# Patient Record
Sex: FEMALE | Race: WHITE
[De-identification: names, ages, dates, MRNs, and addresses within clinical notes are randomized per-mention and may not be internally consistent; named-entity substitution may affect disease eponyms.]

---

## 2020-06-05 ENCOUNTER — HOSPITAL ENCOUNTER (INPATIENT)
Dept: HOSPITAL 41 - JD.OB | Age: 30
LOS: 3 days | Discharge: HOME | End: 2020-06-08
Attending: OBSTETRICS & GYNECOLOGY | Admitting: OBSTETRICS & GYNECOLOGY
Payer: COMMERCIAL

## 2020-06-05 DIAGNOSIS — Z88.0: ICD-10-CM

## 2020-06-05 DIAGNOSIS — Z91.012: ICD-10-CM

## 2020-06-05 DIAGNOSIS — Z79.82: ICD-10-CM

## 2020-06-05 DIAGNOSIS — Z91.040: ICD-10-CM

## 2020-06-05 DIAGNOSIS — E66.9: ICD-10-CM

## 2020-06-05 DIAGNOSIS — Z3A.37: ICD-10-CM

## 2020-06-05 RX ADMIN — MAGNESIUM SULFATE IN WATER SCH MLS/HR: 40 INJECTION, SOLUTION INTRAVENOUS at 22:03

## 2020-06-05 NOTE — PCM.PREANE
Preanesthetic Assessment





- Procedure


Proposed Procedure: 





morena





- Anesthesia/Transfusion/Family Hx


Anesthesia History: No Prior Anesthesia


Family History of Anesthesia Reaction: No


Transfusion History: No Prior Transfusion(s)





- Review of Systems


General: No Symptoms


Pulmonary: No Symptoms


Cardiovascular: No Symptoms


Gastrointestinal: Nausea (tonight)


Neurological: No Symptoms


Other: Reports: None





- Physical Assessment


Vital Signs: 





 Last Vital Signs











Temp  96.5 F L  20 11:23


 


Pulse  119 H  20 11:23


 


Resp  14   20 11:23


 


BP  156/101 H  20 11:23


 


Pulse Ox  97   20 11:23











Height: 5 ft 9 in


Weight: 118.841 kg


ASA Class: 2


Mental Status: Alert & Oriented x3


Airway Class: Mallampati = 1


Dentition: Reports: Normal Dentition


Thyro-Mental Finger Breadths: 3


Mouth Opening Finger Breadths: 3


ROM/Head Extension: Full


Lungs: Clear to Auscultation, Normal Respiratory Effort


Cardiovascular: Regular Rate, Regular Rhythm





- Lab


Values: 





 Laboratory Last Values











WBC  13.46 K/mm3 (3.98-10.04)  H  20  11:45    


 


RBC  4.65 M/mm3 (3.98-5.22)   20  11:45    


 


Hgb  12.0 gm/dl (11.2-15.7)   20  11:45    


 


Hct  37.4 % (34.1-44.9)   20  11:45    


 


MCV  80.4 fl (79.4-94.8)   20  11:45    


 


MCH  25.8 pg (25.6-32.2)   20  11:45    


 


MCHC  32.1 g/dl (32.2-35.5)  L  20  11:45    


 


RDW Std Deviation  48.4 fL (36.4-46.3)  H  20  11:45    


 


Plt Count  263 K/mm3 (182-369)   20  11:45    


 


MPV  11.9 fl (9.4-12.3)   20  11:45    


 


Neut % (Auto)  78.3 % (34.0-71.1)  H  20  11:45    


 


Lymph % (Auto)  14.7 % (19.3-51.7)  L  20  11:45    


 


Mono % (Auto)  5.8 % (4.7-12.5)   20  11:45    


 


Eos % (Auto)  0.6  (0.7-5.8)  L  20  11:45    


 


Baso % (Auto)  0.2 % (0.1-1.2)   20  11:45    


 


Neut # (Auto)  10.54 K/mm3 (1.56-6.13)  H  20  11:45    


 


Lymph # (Auto)  1.98 K/mm3 (1.18-3.74)   20  11:45    


 


Mono # (Auto)  0.78 K/mm3 (0.24-0.36)  H  20  11:45    


 


Eos # (Auto)  0.08 K/mm3 (0.04-0.36)   20  11:45    


 


Baso # (Auto)  0.03 K/mm3 (0.01-0.08)   20  11:45    


 


Sodium  137 mEq/L (136-145)   20  11:45    


 


Potassium  4.0 mEq/L (3.5-5.1)   20  11:45    


 


Chloride  104 mEq/L ()   20  11:45    


 


Carbon Dioxide  20 mEq/L (21-32)  L  20  11:45    


 


Anion Gap  17.0  (5-15)  H  20  11:45    


 


BUN  8 mg/dL (7-18)   20  11:45    


 


Creatinine  0.8 mg/dL (0.55-1.02)   20  11:45    


 


Est Cr Clr Drug Dosing  TNP   20  11:45    


 


Estimated GFR (MDRD)  > 60 mL/min (>60)   20  11:45    


 


BUN/Creatinine Ratio  10.0  (14-18)  L  20  11:45    


 


Glucose  111 mg/dL ()  H  20  11:45    


 


Calcium  9.5 mg/dL (8.5-10.1)   20  11:45    


 


Total Bilirubin  0.3 mg/dL (0.2-1.0)   20  11:45    


 


AST  20 U/L (15-37)   20  11:45    


 


ALT  19 U/L (14-59)   20  11:45    


 


Alkaline Phosphatase  136 U/L ()  H  20  11:45    


 


Total Protein  7.0 g/dl (6.4-8.2)   20  11:45    


 


Albumin  2.7 g/dl (3.4-5.0)  L  20  11:45    


 


Globulin  4.3 gm/dL  20  11:45    


 


Albumin/Globulin Ratio  0.6  (1-2)  L  20  11:45    


 


Ur Random Creatinine  319.4 mg/dL (30.0-125.0)  H  20  11:22    


 


U Random Total Protein  32.3 mg/dL (0.0-11.8)  H  20  11:22    


 


Protein/Creatinin Ratio  101.1 mg/g (0-149)   20  11:22    














- Allergies


Allergies/Adverse Reactions: 


 Allergies











Allergy/AdvReac Type Severity Reaction Status Date / Time


 


latex Allergy  Rash Verified 20 11:46


 


Penicillins Allergy  Rash Verified 20 11:46


 


pumpkin Allergy  Rash Verified 20 11:46


 


eggs Allergy  Nausea and Uncoded 20 11:46





   Vomiting  














- Blood


Blood Available: No





- Acknowledgements


Anesthesia Type Planned: Epidural


Pt an Appropriate Candidate for the Planned Anesthesia: Yes


Alternatives and Risks of Anesthesia Discussed w Pt/Guardian: Yes


Pt/Guardian Understands and Agrees with Anesthesia Plan: Yes





PreAnesthesia Questionnaire


Cardiovascular History: Reports: Hypertension (gestational)


Respiratory History: Reports: None


Gastrointestinal History: Reports: GERD (with preg)


OB/GYN History: Reports: Pregnancy


: 1 (37 weeks)


Para: 0


Endocrine/Metabolic History: Reports: Obesity/BMI 30+





- SUBSTANCE USE


Smoking Status *Q: Never Smoker


Tobacco Use Within Last Twelve Months: No


Second Hand Smoke Exposure: No


Days Per Week of Alcohol Use: 0


Recreational Drug Use History: No





- HOME MEDS


Home Medications: 


 Home Meds





Aspirin [Halfprin] 1 tab PO DAILY 20 [History]


Iron 18 mg PO DAILY 20 [History]


Pnv,Calcium 72/Iron/Folic Acid [Pnv Prenatal Plus Multivit Tab] 1 each PO DAILY 

20 [History]











- CURRENT (IN HOUSE) MEDS


Current Meds: 





 Current Medications





Acetaminophen (Tylenol)  650 mg PO Q6H PRN


   PRN Reason: Pain (Mild 1-3) and fever


Calcium Gluconate (Calcium Gluconate)  1 gm IVPUSH ONETIME PRN


   PRN Reason: Other


   Stop: 20 22:00


Diphenhydramine HCl (Benadryl)  25 mg IVPUSH Q6H PRN


   PRN Reason: pruritis


Ephedrine Sulfate (Ephedrine Sulfate)  5 mg IVPUSH ASDIRECTED PRN


   PRN Reason: Hypotension


Fentanyl (Sublimaze)  100 mcg EPIDUR Q3H PRN


   PRN Reason: Pain


   Last Admin: 20 22:05 Dose:  100 mcg


Fentanyl/Bupivacaine HCl (Fentanyl/Bupivacaine/Ns 2 Mcg-0.125% 100 Ml)  100 ml 

EPIDUR ASDIRECTED PRN


   PRN Reason: Pain


   Last Admin: 20 22:05 Dose:  100 ml


Lactated Ringer's (Ringers, Lactated)  1,000 mls @ 100 mls/hr IV ASDIRECTED JANE


   Last Admin: 20 22:14 Dose:  100 mls/hr


Lactated Ringer's (Ringers, Lactated)  1,000 mls @ 40 mls/hr IV ASDIRECTED JANE


Oxytocin/Lactated Ringer's (Pitocin In Lr 10 Units/1,000 Ml)  10 unit in 1,000 

mls @ 100 mls/hr IV .CONTINUOUS JANE


Oxytocin/Lactated Ringer's (Pitocin In Lr 10 Units/1,000 Ml)  10 unit in 1,000 

mls @ 12 mls/hr IV TITRATE JANE; Protocol


   Last Titration: 20 19:46 Dose:  14 munits/min, 84 mls/hr


Magnesium Sulfate (Magnesium Sulfate In Water Premix)  40 gm in 1,000 mls @ 50 

mls/hr IV ASDIRECTED JANE


   Last Admin: 20 22:03 Dose:  50 mls/hr


Magnesium Sulfate 4 gm/ Premix  50 mls @ 12.5 mls/hr IV ONETIME ONE


   Stop: 20 01:09


   Last Admin: 20 21:38 Dose:  100 mls/hr


Misoprostol (Cytotec)  25 mcg VAG Q4H PRN


   PRN Reason: cervical ripening


Nalbuphine HCl (Nubain)  10 mg IVPUSH Q2H PRN


   PRN Reason: Pain


Sodium Chloride (Saline Flush)  10 ml FLUSH ASDIRECTED PRN


   PRN Reason: Keep Vein Open





Discontinued Medications





Acetaminophen (Tylenol)  650 mg PO Q6H PRN


   PRN Reason: Pain (Mild 1-3) and fever


Labetalol HCl (Normodyne)  20 mg IVPUSH ONETIME STA; Protocol


   Stop: 20 20:14


   Last Admin: 20 20:20 Dose:  20 mg


Labetalol HCl (Normodyne)  40 mg IVPUSH ONETIME STA; Protocol


   Stop: 20 20:45


   Last Admin: 20 20:49 Dose:  40 mg


Misoprostol (Cytotec) Confirm Administered Dose 25 mcg .ROUTE .STK-MED ONE


   Stop: 20 11:55


   Last Admin: 20 12:05 Dose:  25 mcg

## 2020-06-05 NOTE — PCM.PNLD
Labor Progress Note





- VS & Meds


Vital Signs: 


 Last Vital Signs











Temp  35.8 C L  06/05/20 11:23


 


Pulse  119 H  06/05/20 11:23


 


Resp  14   06/05/20 11:23


 


BP  156/101 H  06/05/20 11:23


 


Pulse Ox  97   06/05/20 11:23











Active Medications: 


 Current Medications





Acetaminophen (Tylenol)  650 mg PO Q6H PRN


   PRN Reason: Pain (Mild 1-3) and fever


Calcium Gluconate (Calcium Gluconate)  1 gm IVPUSH ONETIME PRN


   PRN Reason: Other


   Stop: 06/07/20 22:00


Diphenhydramine HCl (Benadryl)  25 mg IVPUSH Q6H PRN


   PRN Reason: pruritis


Ephedrine Sulfate (Ephedrine Sulfate)  5 mg IVPUSH ASDIRECTED PRN


   PRN Reason: Hypotension


Fentanyl (Sublimaze)  100 mcg EPIDUR Q3H PRN


   PRN Reason: Pain


   Last Admin: 06/05/20 22:05 Dose:  100 mcg


Fentanyl/Bupivacaine HCl (Fentanyl/Bupivacaine/Ns 2 Mcg-0.125% 100 Ml)  100 ml 

EPIDUR ASDIRECTED PRN


   PRN Reason: Pain


   Last Admin: 06/05/20 22:05 Dose:  100 ml


Lactated Ringer's (Ringers, Lactated)  1,000 mls @ 100 mls/hr IV ASDIRECTED JANE


   Last Admin: 06/05/20 22:14 Dose:  100 mls/hr


Lactated Ringer's (Ringers, Lactated)  1,000 mls @ 40 mls/hr IV ASDIRECTED JANE


Oxytocin/Lactated Ringer's (Pitocin In Lr 10 Units/1,000 Ml)  10 unit in 1,000 

mls @ 100 mls/hr IV .CONTINUOUS JANE


Oxytocin/Lactated Ringer's (Pitocin In Lr 10 Units/1,000 Ml)  10 unit in 1,000 

mls @ 12 mls/hr IV TITRATE JANE; Protocol


   Last Titration: 06/05/20 19:46 Dose:  14 munits/min, 84 mls/hr


Magnesium Sulfate (Magnesium Sulfate In Water Premix)  40 gm in 1,000 mls @ 50 

mls/hr IV ASDIRECTED JANE


   Last Admin: 06/05/20 22:03 Dose:  50 mls/hr


Magnesium Sulfate 4 gm/ Premix  50 mls @ 12.5 mls/hr IV ONETIME ONE


   Stop: 06/06/20 01:09


   Last Admin: 06/05/20 21:38 Dose:  100 mls/hr


Misoprostol (Cytotec)  25 mcg VAG Q4H PRN


   PRN Reason: cervical ripening


Nalbuphine HCl (Nubain)  10 mg IVPUSH Q2H PRN


   PRN Reason: Pain


Sodium Chloride (Saline Flush)  10 ml FLUSH ASDIRECTED PRN


   PRN Reason: Keep Vein Open





Discontinued Medications





Acetaminophen (Tylenol)  650 mg PO Q6H PRN


   PRN Reason: Pain (Mild 1-3) and fever


Labetalol HCl (Normodyne)  20 mg IVPUSH ONETIME STA; Protocol


   Stop: 06/05/20 20:14


   Last Admin: 06/05/20 20:20 Dose:  20 mg


Labetalol HCl (Normodyne)  40 mg IVPUSH ONETIME STA; Protocol


   Stop: 06/05/20 20:45


   Last Admin: 06/05/20 20:49 Dose:  40 mg


Misoprostol (Cytotec) Confirm Administered Dose 25 mcg .ROUTE .STK-MED ONE


   Stop: 06/05/20 11:55


   Last Admin: 06/05/20 12:05 Dose:  25 mcg











- Uterine Contractions


Uterine Monitoring Mode: External [place]


Contraction Frequency (min): 2-4


Contraction Duration (sec): 45-60


Contraction Intensity: Irritability


Uterine Resting Tone: Soft





- Fetal Monitoring


Fetal Monitor Mode: Doppler/Auscultation


Fetal Heart Rate (FHR) Baseline: 130


Fetal Heart Rate (FHR) Per Doppler: 130


Fetal Heart Rate (FHR) Variability: Moderate (6-25 bmp)


Fetal Accelerations: Present, 15x15


Fetal Decelerations: Late, Intermittent (<50% x 20 min)


Fetal Strip Review: Category II





- Vaginal Exam


Dilation (cm): 5


Effacement (Percent): 90


Station: -1


Cervical Position: Anterior


Sterile Vaginal Exam Performed By: Adrián Levi





- Labor Progress (Free Text)


Labor Progress: 





Patient continuing to make progress at this time with cervical exam at 5/90/-1/

soft/anterior


Epidural in place and providing good relief


Patient previously with multiple severe range blood pressures requiring IV 

labetalol 20 mg then IV labetalol 40 mg


Patient now with normal range blood pressures


Patient diagnosed with preeclampsia with severe features based on the severe 

range blood pressures


Started on magnesium for seizure prophylaxis with a 4 g bolus then 2 g/h


Patient with 4+ reflexes in bilateral bicep tendon


Routine vitals


Continue Pitocin for augmentation of labor


Run total IV fluids at 125 mL/h


Anticipate vaginal delivery unless otherwise indicated





Adrián Levi MD


11:05 PM


6/5/2020

## 2020-06-05 NOTE — PCM.LDHP
L&D History of Present Illness





- General


Date of Service: 20


Admit Problem/Dx: 


 Patient Status Order with Admit Dx/Problem





20 11:23


Patient Status [ADT] Routine 








 Admission Diagnosis/Problem











Admission Diagnosis/Problem    Gestational hypertension














Source of Information: Patient


History Limitations: Reports: No Limitations





- History of Present Illness


Introduction:: 





Dipti Gutierrez is a 29-year-old G1, P0 at 37 weeks 0 days by LMP consistent 

with 11-week ultrasound who presents for induction of labor in the setting of 

gestational hypertension.  She was seen in the clinic today, 2020, and was 

noted to have ongoing mild range blood pressures with a BP of 144/94.  She had 

previously been seen earlier in the week and had blood pressures in the 130s to 

140s/80s to 90s.  Her labs earlier in the week were within normal limits and 

did not show any low platelets, elevated liver enzymes, elevated creatinine or 

elevated urine protein/creatinine ratio.  Patient denies any severe symptoms of 

preeclampsia without any significant headaches, vision changes or scotomata or 

epigastric pain.  She reports that she has been having some [place] 

contractions since she was seen on 2020 where she will have a contraction 

about every 2 hours.  She denies any leaking of fluid or vaginal bleeding.  

Reports good fetal movement.


Present Illness Comments:: 





Dipti Gutierrez is a 29-year-old G1, P0 at 37 weeks 0 days by LMP consistent 

with 11-week ultrasound who presents for medically indicated induction of labor 

in the setting of gestational hypertension.  She has had routine prenatal care 

with myself, Dr. Levi, since 11 weeks gestational age.  Her pregnancy has been 

overall uncomplicated until this week when she started to develop mild range 

blood pressures.  She was given Tdap vaccine on 2020.  She declined 

influenza vaccine due to history of severe anaphylaxis reaction with eggs.





This pregnancy is complicated by:


* Gestational hypertension diagnosed in the clinic with ongoing mild range 

blood pressure of 144/94 today with previous mild range blood pressures earlier 

in the week.  No signs or symptoms of preeclampsia.


* Obesity in pregnancy


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








OB/GYN history


: Current pregnancy








Prenatal labs


Blood type: A+


Antibody screen: Negative


First trimester hematocrit/hemoglobin: 38.6%/12.5 on 2019


Platelets: 326 on 2019


Urine culture: Mixed barbra suggestive of contamination


Rubella status: Immune


Hepatitis B surface antigen: Negative


RPR: Negative


HIV: Negative


Gonorrhea: Negative


Chlamydia: Negative


Anatomy ultrasound: Normal anatomy, no abnormalities, posterior placenta, 82nd 

percentile on most recent ultrasound on 2020, normal fluid, normal heart 

rate


One hour glucose tolerance test: 122


Second trimester hematocrit/hemoglobin: 34.2%/10.6 on 3/17/2020


Platelets: 302 on 3/17/2020


Third trimester hematocrit/hemoglobin: 37.0%/11.7 on 2020


Platelets: 257 on 2020


Creatinine: 0.7 on 2020


ALT: 25 on 2020


AST: 24 on 2020


GBS status: Negative





- Related Data


Allergies/Adverse Reactions: 


 Allergies











Allergy/AdvReac Type Severity Reaction Status Date / Time


 


latex Allergy  Rash Verified 20 11:46


 


Penicillins Allergy  Rash Verified 20 11:46


 


pumpkin Allergy  Rash Verified 20 11:46


 


eggs Allergy  Nausea and Uncoded 20 11:46





   Vomiting  











Home Medications: 


 Home Meds





Aspirin [Halfprin] 1 tab PO DAILY 20 [History]


Iron 18 mg PO DAILY 20 [History]


Pnv,Calcium 72/Iron/Folic Acid [Pnv Prenatal Plus Multivit Tab] 1 each PO DAILY 

20 [History]











Past Medical History


OB/GYN History: Reports: Pregnancy


: 1


Para: 0





Social & Family History





- Tobacco Use


Smoking Status *Q: Never Smoker


Tobacco Use Within Last Twelve Months: No





- Tobacco Core Measures


Tobacco Use/Smoking Within Last 30 Days: No


Smokeless Tobacco Use in Last 30 Days: No





- Alcohol Use


Alcohol Use History: No





- Recreational Drug Use


Recreational Drug Use: No


Drug Use in Last 12 Months: No





- Living Situation & Occupation


Living situation: Reports: , with Spouse


Occupation: Employed





H&P Review of Systems





- Review of Systems:


Review Of Systems: See Below


General: Denies: Fever, Chills, Malaise, Weakness, Fatigue


HEENT: Denies: Dysphasia, Ear Pain, Glasses, Headaches, Sore Throat, Visual 

Changes


Pulmonary: Denies: Shortness of Breath, Wheezing, Pleuritic Chest Pain, Cough


Cardiovascular: Denies: Chest Pain, Palpitations, Dyspnea on Exertion, Orthopnea


Gastrointestinal: Reports: Diarrhea (over last few days).  Denies: Abdominal 

Pain, Constipation, Nausea, Vomiting


Genitourinary: Denies: Dysuria, Frequency, Burning, Pain, Urgency


Musculoskeletal: Reports: Back Pain


Skin: Denies: Rash, Lesions


Psychiatric: Denies: Depression, Anxiety


Neurological: Denies: Headache


Hematologic/Lymphatic: Denies: Anemia





L&D Exam





- Exam


Exam: See Below





- OB Specific


Contraction Intensity: Irritability


Fetal Movement: Active


Fetal Heart Tones: Present


Fetal Heart Tones per Min: 140 (+15 x 15 accelerations, no decelerations)


Fetal Heart Rate (FHR) Variability: Moderate (6-25 bmp)


Birth Presentation: Vertex


Estimated Fetal Weight: 7 to 7.5 pounds by Leopold





- Cadet Score


Cadet Score Cervix Position: Posterior


Cadet Score Consistency: Medium


Cadet Score Effacement: 31-50% (50%)


Cadet Score Dilation: 1-2 cm (1.5 cm)


Cadet Score Infant's Station: -3 (-4)


Cadet Score Total: 3





- Exam


General: Alert, Oriented


HEENT: Conjunctiva Clear, EOMI


Neck: Supple, Trachea Midline


Lungs: Clear to Auscultation, Normal Respiratory Effort


Cardiovascular: Regular Rate, Regular Rhythm


GI/Abdominal Exam: Soft, Non-Tender, No Distention, Other (Gravid).  No: 

Guarding, Rigid, Rebound


Genitourinary: Other (18 [place] Rae bulb placed transcervically with speculum 

and ring forceps and filled with 60 mL of sterile saline.  Mother and infant 

tolerated without difficulty.)


Extremities: Normal Inspection, Pedal Edema (Trace)


Skin: Warm, Dry, Intact


Psychiatric: Alert, Normal Affect, Normal Mood





- Patient Data


Result Diagrams: 


 20 11:45








- Problem List


(1) 37 weeks gestation of pregnancy


SNOMED Code(s): 95032863


   ICD Code: Z3A.37 - 37 WEEKS GESTATION OF PREGNANCY   Status: Acute   Current 

Visit: Yes   





(2) Gestational hypertension


SNOMED Code(s): 239651506


   ICD Code: O13.9 - GESTATIONAL HTN W/O SIGNIFICANT PROTEINURIA, UNSP 

TRIMESTER   Status: Acute   Current Visit: Yes   





(3) Obesity affecting pregnancy


SNOMED Code(s): 502551029276, 176871139167


   ICD Code: O99.210 - OBESITY COMPLICATING PREGNANCY, UNSPECIFIED TRIMESTER   

Status: Acute   Current Visit: Yes   


Problem List Initiated/Reviewed/Updated: Yes


Orders Last 24hrs: 


 Active Orders 24 hr











 Category Date Time Status


 


 Patient Status [ADT] Routine ADT  20 11:23 Ordered


 


 Activity as Tolerated [RC] PFP Care  20 11:23 Ordered


 


 Communication Order [RC] ASDIRECTED Care  20 11:23 Ordered


 


 Communication Order [RC] ASDIRECTED Care  20 11:23 Ordered


 


 Communication Order [RC] ASDIRECTED Care  20 11:23 Ordered


 


 Communication Order [RC] ASDIRECTED Care  20 11:23 Ordered


 


 Fetal Heart Tones [RC] ASDIRECTED Care  20 11:23 Ordered


 


 Fetal Non Stress Test [RC] PER UNIT ROUTINE Care  20 11:23 Ordered


 


 Notify Provider Vital Signs [RC] PRN Care  20 11:24 Ordered


 


 Notify Provider [RC] ASDIRECTED Care  20 11:23 Ordered


 


 Notify Provider [RC] ASDIRECTED Care  20 11:26 Ordered


 


 Notify Provider [RC] PFP Care  20 11:23 Ordered


 


 Notify Provider [RC] PRN Care  20 11:23 Ordered


 


 Peripheral IV Care [RC] .AS DIRECTED Care  20 11:23 Ordered


 


 Pump Management, Intrathecal [RC] ASDIRECTED Care  20 11:24 Ordered


 


 Vaginal Exam [RC] ASDIRECTED Care  20 11:23 Ordered


 


 Vital Signs [RC] PER UNIT ROUTINE Care  20 11:23 Ordered


 


 Regular Diet [DIET] Diet  20 Lunch Ordered


 


 CBC WITH AUTO DIFF [HEME] Routine Lab  20 11:22 Ordered


 


 COMPREHENSIVE METABOLIC PN,CMP [CHEM] Routine Lab  20 11:22 Ordered


 


 PROTEIN/CREATININE RATIO,URINE [URCHEM] Routine Lab  20 11:22 Ordered


 


 RAPID PLASMA REAGIN,RPR [CHEM] Routine Lab  20 11:23 Ordered


 


 Acetaminophen [Tylenol] Med  20 11:22 Ordered





 650 mg PO Q6H PRN   


 


 Lactated Ringers [Ringers, Lactated] 1,000 ml Med  20 11:30 Ordered





 IV ASDIRECTED   


 


 Lactated Ringers [Ringers, Lactated] 1,000 ml Med  20 11:30 Ordered





 IV ASDIRECTED   


 


 Nalbuphine [Nubain] Med  20 11:22 Ordered





 10 mg IVPUSH Q2H PRN   


 


 Oxytocin/Lactated Ringers [Pitocin in LR 10 Units/1,000 Med  20 11:30 

Ordered





 ML]   





 10 unit in 1,000 ml IV .CONTINUOUS   


 


 Sodium Chloride 0.9% [Saline Flush] Med  20 11:22 Ordered





 10 ml FLUSH ASDIRECTED PRN   


 


 miSOPROStoL [Cytotec] Med  20 11:22 Ordered





 25 mcg VAG Q4H PRN   


 


 Electronic Fetal Heart Tones Ext w TOCO [WOMSER] Ot  20 11:23 Ordered





 Routine   


 


 Electronic Fetal Heart Tones Internal [WOMSER] Per Unit Ot  20 11:23 

Ordered





 Routine   


 


 Medication Administration Instruction [OM.PC] Ot  20 11:30 Ordered





 ASDIRECTED   


 


 Peripheral IV Insertion Adult [OM.PC] Routine Ot  20 11:23 Ordered


 


 Resuscitation Status Routine Resus Stat  20 11:22 Ordered











Assessment/Plan Comment:: 





Refer to observation for elective induction of labor


Transcervical 18 [place] Rae bulb placed with speculum and ring forceps.  

Mother and infant tolerated without difficulty.


Start induction of labor with Cytotec 25 mcg vaginally now and every 4 hours


Intermittent monitoring while on Cytotec with monitoring for 30 minutes after 

placement of Cytotec and may ambulate as tolerated with category 1 monitoring


Place IV and have Lactated Ringer's at 125 ml/hr if not tolerating regular diet


May have regular diet while on Cytotec induction


Activity as tolerated


May have epidural as desired


Plans to breast-feed after delivery


Anticipate vaginal delivery unless otherwise indicated








Adrián Levi MD


12:10 PM


2020

## 2020-06-06 PROCEDURE — 3E0R3BZ INTRODUCTION OF ANESTHETIC AGENT INTO SPINAL CANAL, PERCUTANEOUS APPROACH: ICD-10-PCS | Performed by: OBSTETRICS & GYNECOLOGY

## 2020-06-06 PROCEDURE — 0KQM0ZZ REPAIR PERINEUM MUSCLE, OPEN APPROACH: ICD-10-PCS | Performed by: OBSTETRICS & GYNECOLOGY

## 2020-06-06 PROCEDURE — 3E0P7VZ INTRODUCTION OF HORMONE INTO FEMALE REPRODUCTIVE, VIA NATURAL OR ARTIFICIAL OPENING: ICD-10-PCS | Performed by: OBSTETRICS & GYNECOLOGY

## 2020-06-06 PROCEDURE — 00HU33Z INSERTION OF INFUSION DEVICE INTO SPINAL CANAL, PERCUTANEOUS APPROACH: ICD-10-PCS | Performed by: OBSTETRICS & GYNECOLOGY

## 2020-06-06 RX ADMIN — VITAMIN A, ASCORBIC ACID, CHOLECALCIFEROL, .ALPHA.-TOCOPHEROL ACETATE, DL-, THIAMINE MONONITRATE, RIBOFLAVIN, NIACINAMIDE, PYRIDOXINE HYDROCHLORIDE, FOLIC ACID, CYANOCOBALAMIN, CALCIUM CARBONATE, IRON, ZINC OXIDE, AND CUPRIC OXIDE SCH EACH: 4000; 120; 400; 22; 1.84; 3; 20; 10; 1; 12; 200; 29; 25; 2 TABLET ORAL at 17:20

## 2020-06-06 RX ADMIN — MAGNESIUM SULFATE IN WATER SCH MLS/HR: 40 INJECTION, SOLUTION INTRAVENOUS at 17:21

## 2020-06-06 NOTE — PCM.DEL
L & D Note





- General Info


Date of Service: 20


Mother's Due Date: 20





- Delivery Note


Labor: Augmented by Oxytocin


Cervical Ripening Method: Balloon Device (18 French transcervical Rae bulb 

filled with 60 mL of sterile saline), Misoprostil, Oxytocin


Delivery Outcome: Livebirth


Infant Delivery Method: Spontaneous Vaginal Delivery-Single


Birth Presentation: Left Occiput Anterior (MARIA LUISA)


Nuchal Cord: None


Prep: Povidone-Iodine (Betadine


Anesthesia Type: Epidural


Anesthetic: Lidocaine (Xylocaine) 1% Plain


Local Anesthetic Volume: Other (7 mL)


Amniotic Fluid Description: Clear


Episiotomy Type: None


Laceration: 2nd Degree (Midline perineal repaired with 3-0 Vicryl), Labial (

Right side, repaired with running suture of 4-0 Vicryl)


Suture type: Vicryl


Suture size: 3-0


Placenta: Intact, Spontaneous


Cord: 3 Vessels


Estimated Blood Loss: 400


Resuscitation Needed: Yes


: Bulb Syringe, Cathether, Stimulated, Warmed, Cathay Used, Warmer Used


 Provider: Adrián Levi


APGAR Score 1 min: 7


APGAR Score 5 min: 9


Second Stage Interventions: Reports: Pushing Effectively, Pushing, Stirrups/Leg 

Supports


Delivery Comments (Free Text/Narrative):: 





Stage I: Dipti Gutierrez was admitted for induction of labor in the setting of 

gestational hypertension with ongoing mild range blood pressures in the clinic.

  She had been seen in the clinic prior to being sent down to labor and 

delivery with a blood pressure of 144/94. On admission her cervix was dilated 

to 1.5 cm. She was GBS negative.  On admission her labs were all within normal 

limits and did not show any evidence of severe features of preeclampsia with 

platelets of 263, normal creatinine of 0.8, AST 20 and ALT 19.  Her urine 

protein/creatinine ratio was 0.10.  Based on these results she was diagnosed 

with gestational hypertension with continued monitoring during the induction.  

She had an 18 French Rae bulb placed transcervically with use of speculum and 

ring forceps and was filled with 60 mL of sterile saline.  She was given 

Cytotec 25 mcg vaginally for cervical ripening.  The Rae bulb came out 

spontaneously after approximately 4 hours of time.  She was transitioned to 

Pitocin for augmentation of labor.  She had spontaneous rupture of membranes 

with clear fluid.  In the early evening of hospital day 1 she began having 

severe range blood pressures into the 160s to 170s/90s to 100s.  She was 

treated with IV labetalol 20 mg and her blood pressure was rechecked and it 

continued to be in the 160s/100s and was given the next dose of labetalol 40 mg 

IV.  After 10 minutes her blood pressure was normalized.  She continued to have 

mild range blood pressures throughout the remainder of her induction.  She was 

given an epidural for anesthesia.  She progressed to complete and pushing.





Stage II: On 2020 she had a normal vaginal delivery of a live male infant 

at 06:45. Apgars of 7 & 9. Weight of 3580 g (7 lbs 14.3 oz). Length of 20 

inches. There was no nuchal cord. Infant was delivered in MARIA LUISA position. The 

cord was doubly clamped and cut by father the infant. Infant was placed on 

mother's abdomen initially and then taken to the warmer for further 

resuscitation.





Stage III: She had a spontaneous delivery of an intact placenta in Leilani 

presentation.  Three vessel cord. She was given pitocin and fundal massage. She 

had a second-degree midline perineal laceration that was repaired with 3-0 

Vicryl.  She had a right labial laceration that was repaired with running 

suture of 4-0 Vicryl.  During repair of the right labial laceration she had 

injection of 1% lidocaine 7 mL.  Mom and baby were stable to recovery. EBL of 

400 mL.








Adrián Levi MD


7:59 AM


2020





Induction Criteria





- Cadet Score


Cadet Score Dilation: 1-2 cm


Cadet Score Effacement: 40-50%


Cadet Score Infant's Station: -3


Cadet Score Consistency: Medium


Cadet Score Cervix Position: Midposition


Cadet Score Total: 4


Cadet Score Presenting Part: Reports: Cephalic





- Induction


Gestational Age >/= 39 wks: No


Medical Indication: 





Gestational hypertension


Estimated Pelvis: Reports: Adequate


Reassuring Fetal Monitoring Strip: Yes


Absence of Tachy Systole: Yes





- Augmentation


Estimated Pelvis: Reports: Adequate


Fetal Weight Estimated:: Reports: AGA


Reassuring Fetal Monitoring Strip: Yes


Absence of Tachy Systole: Yes





- General Info


Date of Service: 20





- Patient Data


Vitals - Most Recent: 


 Last Vital Signs











Temp  35.8 C L  20 11:23


 


Pulse  119 H  06/05/20 11:23


 


Resp  14   20 11:23


 


BP  156/101 H  20 11:23


 


Pulse Ox  97   20 11:23











Weight - Most Recent: 118.841 kg


I&O - Last 24 Hours: 


 Intake & Output











 20





 22:59 06:59 14:59


 


Output Total 800  


 


Balance -800  











Lab Results Last 24 Hours: 


 Laboratory Results - last 24 hr











  20 Range/Units





  11:22 11:45 11:45 


 


WBC   13.46 H   (3.98-10.04)  K/mm3


 


RBC   4.65   (3.98-5.22)  M/mm3


 


Hgb   12.0   (11.2-15.7)  gm/dl


 


Hct   37.4   (34.1-44.9)  %


 


MCV   80.4   (79.4-94.8)  fl


 


MCH   25.8   (25.6-32.2)  pg


 


MCHC   32.1 L   (32.2-35.5)  g/dl


 


RDW Std Deviation   48.4 H   (36.4-46.3)  fL


 


Plt Count   263   (182-369)  K/mm3


 


MPV   11.9   (9.4-12.3)  fl


 


Neut % (Auto)   78.3 H   (34.0-71.1)  %


 


Lymph % (Auto)   14.7 L   (19.3-51.7)  %


 


Mono % (Auto)   5.8   (4.7-12.5)  %


 


Eos % (Auto)   0.6 L   (0.7-5.8)  


 


Baso % (Auto)   0.2   (0.1-1.2)  %


 


Neut # (Auto)   10.54 H   (1.56-6.13)  K/mm3


 


Lymph # (Auto)   1.98   (1.18-3.74)  K/mm3


 


Mono # (Auto)   0.78 H   (0.24-0.36)  K/mm3


 


Eos # (Auto)   0.08   (0.04-0.36)  K/mm3


 


Baso # (Auto)   0.03   (0.01-0.08)  K/mm3


 


Sodium    137  (136-145)  mEq/L


 


Potassium    4.0  (3.5-5.1)  mEq/L


 


Chloride    104  ()  mEq/L


 


Carbon Dioxide    20 L  (21-32)  mEq/L


 


Anion Gap    17.0 H  (5-15)  


 


BUN    8  (7-18)  mg/dL


 


Creatinine    0.8  (0.55-1.02)  mg/dL


 


Est Cr Clr Drug Dosing    TNP  


 


Estimated GFR (MDRD)    > 60  (>60)  mL/min


 


BUN/Creatinine Ratio    10.0 L  (14-18)  


 


Glucose    111 H  ()  mg/dL


 


Calcium    9.5  (8.5-10.1)  mg/dL


 


Total Bilirubin    0.3  (0.2-1.0)  mg/dL


 


AST    20  (15-37)  U/L


 


ALT    19  (14-59)  U/L


 


Alkaline Phosphatase    136 H  ()  U/L


 


Total Protein    7.0  (6.4-8.2)  g/dl


 


Albumin    2.7 L  (3.4-5.0)  g/dl


 


Globulin    4.3  gm/dL


 


Albumin/Globulin Ratio    0.6 L  (1-2)  


 


Ur Random Creatinine  319.4 H    (30.0-125.0)  mg/dL


 


U Random Total Protein  32.3 H    (0.0-11.8)  mg/dL


 


Protein/Creatinin Ratio  101.1    (0-149)  mg/g


 


RPR     (NONREACTIVE)  














  20 Range/Units





  11:45 


 


WBC   (3.98-10.04)  K/mm3


 


RBC   (3.98-5.22)  M/mm3


 


Hgb   (11.2-15.7)  gm/dl


 


Hct   (34.1-44.9)  %


 


MCV   (79.4-94.8)  fl


 


MCH   (25.6-32.2)  pg


 


MCHC   (32.2-35.5)  g/dl


 


RDW Std Deviation   (36.4-46.3)  fL


 


Plt Count   (182-369)  K/mm3


 


MPV   (9.4-12.3)  fl


 


Neut % (Auto)   (34.0-71.1)  %


 


Lymph % (Auto)   (19.3-51.7)  %


 


Mono % (Auto)   (4.7-12.5)  %


 


Eos % (Auto)   (0.7-5.8)  


 


Baso % (Auto)   (0.1-1.2)  %


 


Neut # (Auto)   (1.56-6.13)  K/mm3


 


Lymph # (Auto)   (1.18-3.74)  K/mm3


 


Mono # (Auto)   (0.24-0.36)  K/mm3


 


Eos # (Auto)   (0.04-0.36)  K/mm3


 


Baso # (Auto)   (0.01-0.08)  K/mm3


 


Sodium   (136-145)  mEq/L


 


Potassium   (3.5-5.1)  mEq/L


 


Chloride   ()  mEq/L


 


Carbon Dioxide   (21-32)  mEq/L


 


Anion Gap   (5-15)  


 


BUN   (7-18)  mg/dL


 


Creatinine   (0.55-1.02)  mg/dL


 


Est Cr Clr Drug Dosing   


 


Estimated GFR (MDRD)   (>60)  mL/min


 


BUN/Creatinine Ratio   (14-18)  


 


Glucose   ()  mg/dL


 


Calcium   (8.5-10.1)  mg/dL


 


Total Bilirubin   (0.2-1.0)  mg/dL


 


AST   (15-37)  U/L


 


ALT   (14-59)  U/L


 


Alkaline Phosphatase   ()  U/L


 


Total Protein   (6.4-8.2)  g/dl


 


Albumin   (3.4-5.0)  g/dl


 


Globulin   gm/dL


 


Albumin/Globulin Ratio   (1-2)  


 


Ur Random Creatinine   (30.0-125.0)  mg/dL


 


U Random Total Protein   (0.0-11.8)  mg/dL


 


Protein/Creatinin Ratio   (0-149)  mg/g


 


RPR  Non-reactive  (NONREACTIVE)  











Med Orders - Current: 


 Current Medications





Acetaminophen (Tylenol)  650 mg PO Q6H PRN


   PRN Reason: Pain (Mild 1-3) and fever


Calcium Gluconate (Calcium Gluconate)  1 gm IVPUSH ONETIME PRN


   PRN Reason: Other


   Stop: 20 22:00


Diphenhydramine HCl (Benadryl)  25 mg IVPUSH Q6H PRN


   PRN Reason: pruritis


Ephedrine Sulfate (Ephedrine Sulfate)  5 mg IVPUSH ASDIRECTED PRN


   PRN Reason: Hypotension


Fentanyl (Sublimaze)  100 mcg EPIDUR Q3H PRN


   PRN Reason: Pain


   Last Admin: 20 22:05 Dose:  100 mcg


Fentanyl/Bupivacaine HCl (Fentanyl/Bupivacaine/Ns 2 Mcg-0.125% 100 Ml)  100 ml 

EPIDUR ASDIRECTED PRN


   PRN Reason: Pain


   Last Admin: 20 22:05 Dose:  100 ml


Lactated Ringer's (Ringers, Lactated)  1,000 mls @ 100 mls/hr IV ASDIRECTED JANE


   Last Infusion: 20 23:00 Dose:  35 mls/hr


Lactated Ringer's (Ringers, Lactated)  1,000 mls @ 40 mls/hr IV ASDIRECTED JANE


Oxytocin/Lactated Ringer's (Pitocin In Lr 10 Units/1,000 Ml)  10 unit in 1,000 

mls @ 100 mls/hr IV .CONTINUOUS JANE


   Last Admin: 20 07:07 Dose:  500 mls/hr


Oxytocin/Lactated Ringer's (Pitocin In Lr 10 Units/1,000 Ml)  10 unit in 1,000 

mls @ 12 mls/hr IV TITRATE JANE; Protocol


   Last Titration: 20 06:45 Dose:  999 munits/min, 5,994 mls/hr


Magnesium Sulfate (Magnesium Sulfate In Water Premix)  40 gm in 1,000 mls @ 50 

mls/hr IV ASDIRECTED JANE


   Stop: 20 06:45


   Last Admin: 20 22:03 Dose:  50 mls/hr


Misoprostol (Cytotec)  25 mcg VAG Q4H PRN


   PRN Reason: cervical ripening


Nalbuphine HCl (Nubain)  10 mg IVPUSH Q2H PRN


   PRN Reason: Pain


Sodium Chloride (Saline Flush)  10 ml FLUSH ASDIRECTED PRN


   PRN Reason: Keep Vein Open





Discontinued Medications





Acetaminophen (Tylenol)  650 mg PO Q6H PRN


   PRN Reason: Pain (Mild 1-3) and fever


Magnesium Sulfate 4 gm/ Premix  50 mls @ 12.5 mls/hr IV ONETIME ONE


   Stop: 20 01:09


   Last Admin: 20 21:38 Dose:  100 mls/hr


Labetalol HCl (Normodyne)  20 mg IVPUSH ONETIME STA; Protocol


   Stop: 20 20:14


   Last Admin: 20 20:20 Dose:  20 mg


Labetalol HCl (Normodyne)  40 mg IVPUSH ONETIME STA; Protocol


   Stop: 20 20:45


   Last Admin: 20 20:49 Dose:  40 mg


Lidocaine HCl (Xylocaine 1%) Confirm Administered Dose 50 ml .ROUTE .STK-MED ONE


   Stop: 20 07:05


   Last Admin: 20 07:08 Dose:  50 ml


Lidocaine HCl (Xylocaine 1%)  50 ml INJECT ONETIME ONE


   Stop: 20 07:09


Misoprostol (Cytotec) Confirm Administered Dose 25 mcg .ROUTE .STK-MED ONE


   Stop: 20 11:55


   Last Admin: 20 12:05 Dose:  25 mcg











- Problem List & Annotations


(1) 37 weeks gestation of pregnancy


SNOMED Code(s): 96510841


   Code(s): Z3A.37 - 37 WEEKS GESTATION OF PREGNANCY   Status: Acute   Current 

Visit: Yes   





(2) Obesity affecting pregnancy


SNOMED Code(s): 055086564725, 777651084555


   Code(s): O99.210 - OBESITY COMPLICATING PREGNANCY, UNSPECIFIED TRIMESTER   

Status: Acute   Current Visit: Yes   





(3) Preeclampsia, severe


SNOMED Code(s): 93151874


   Code(s): O14.10 - SEVERE PRE-ECLAMPSIA, UNSPECIFIED TRIMESTER   Status: 

Acute   Current Visit: Yes   





(4) Vaginal delivery


SNOMED Code(s): 971053017


   Code(s): O80 - ENCOUNTER FOR FULL-TERM UNCOMPLICATED DELIVERY   Status: 

Acute   Current Visit: Yes   





(5) Second degree perineal laceration during delivery


SNOMED Code(s): 9041653


   Code(s): O70.1 - SECOND DEGREE PERINEAL LACERATION DURING DELIVERY   Status: 

Acute   Current Visit: Yes   





(6) Obstetric labial laceration, delivered, current hospitalization


SNOMED Code(s): 554953983, 831382402, 311875308


   Code(s): O70.0 - FIRST DEGREE PERINEAL LACERATION DURING DELIVERY   Status: 

Acute   Current Visit: Yes   





- Problem List Review


Problem List Initiated/Reviewed/Updated: Yes





- My Orders


Last 24 Hours: 


My Active Orders





20 11:22


Nalbuphine [Nubain]   10 mg IVPUSH Q2H PRN 


Sodium Chloride 0.9% [Saline Flush]   10 ml FLUSH ASDIRECTED PRN 


miSOPROStoL [Cytotec]   25 mcg VAG Q4H PRN 


Resuscitation Status Routine 





20 11:23


Patient Status [ADT] Routine 


Activity as Tolerated [RC] PFP 


Communication Order [RC] ASDIRECTED 


Fetal Heart Tones [RC] ASDIRECTED 


Fetal Non Stress Test [RC] PER UNIT ROUTINE 


Notify Provider [RC] ASDIRECTED 


Notify Provider [RC] PFP 


Notify Provider [RC] PRN 


Peripheral IV Care [RC] .AS DIRECTED 


Vaginal Exam [RC] ASDIRECTED 


Vital Signs [RC] PER UNIT ROUTINE 


Electronic Fetal Heart Tones Ext w TOCO [WOMSER] Routine 


Electronic Fetal Heart Tones Internal [WOMSER] Per Unit Routine 


Peripheral IV Insertion Adult [OM.PC] Routine 





20 11:24


Notify Provider Vital Signs [RC] PRN 


Pump Management, Intrathecal [RC] ASDIRECTED 





20 11:26


Notify Provider [RC] ASDIRECTED 





20 11:30


Acetaminophen [Tylenol]   650 mg PO Q6H PRN 


Lactated Ringers [Ringers, Lactated] 1,000 ml IV ASDIRECTED 


Lactated Ringers [Ringers, Lactated] 1,000 ml IV ASDIRECTED 


Oxytocin/Lactated Ringers [Pitocin in LR 10 Units/1,000 ML] 10 unit in 1,000 ml 

IV .CONTINUOUS 


Medication Administration Instruction [OM.PC] ASDIRECTED 





20 16:30


Oxytocin/Lactated Ringers [Pitocin in LR 10 Units/1,000 ML] 10 unit in 1,000 ml 

IV TITRATE 





20 21:15


Magnesium Sulfate/Water [Magnesium Sulfate in Water Premix] 40 gm in 1,000 ml 

IV ASDIRECTED 





20 21:47


Calcium Gluconate   1 gm IVPUSH ONETIME PRN 





20 Lunch


Regular Diet [DIET] 





20 07:42


Patient Status Manage Transfer [TRANSFER] Routine 














- Plan


Plan:: 





Admit to inpatient following normal spontaneous vaginal delivery


Continue Pitocin per unit protocol following delivery of placenta and lactated 

Ringer's until tolerating regular diet


Regular diet


Vitals per unit routine


Ibuprofen and Tylenol for pain control


Assist with breast-feeding as needed


Continue magnesium 2 g/h for 24 hours after delivery


Continue IV fluids to keep a total fluid rate of 125 mL/h


Monitor closely for signs of severe features of preeclampsia with headaches, 

vision changes or epigastric pain


Continue to monitor lochia


Anticipate discharge home on postpartum day #2





Adráin Levi MD


7:59 AM


2020

## 2020-06-07 RX ADMIN — FERROUS SULFATE TAB EC 324 MG (65 MG FE EQUIVALENT) SCH MG: 324 (65 FE) TABLET DELAYED RESPONSE at 09:06

## 2020-06-07 RX ADMIN — VITAMIN A, ASCORBIC ACID, CHOLECALCIFEROL, .ALPHA.-TOCOPHEROL ACETATE, DL-, THIAMINE MONONITRATE, RIBOFLAVIN, NIACINAMIDE, PYRIDOXINE HYDROCHLORIDE, FOLIC ACID, CYANOCOBALAMIN, CALCIUM CARBONATE, IRON, ZINC OXIDE, AND CUPRIC OXIDE SCH: 4000; 120; 400; 22; 1.84; 3; 20; 10; 1; 12; 200; 29; 25; 2 TABLET ORAL at 14:43

## 2020-06-07 NOTE — PCM.SN.2
- Free Text/Narrative


Note: 





Post Partum Progress Note


PPD #1





Subjective:


Doing well overall.  Ambulating without difficulty.  Lochia minimal.  Voiding 

without difficulty.  Tolerating regular diet without nausea or vomiting.  Pain 

controlled with oral medications.  Breast-feeding with minimal difficulty.  She 

denies any headaches, vision changes or epigastric pain.





Objective: 


Vitals:


 Vital Signs - 24 hr











  20





  12:38 15:07 20:20


 


Temperature 36.7 C 36.8 C 36.2 C


 


Pulse,  97 89





Peripheral   


 


Respiratory 14 14 14





Rate   


 


Blood Pressure 149/79 H 142/80 H 146/68 H


 


O2 Sat by Pulse  96 96





Oximetry   














  20





  04:09 08:51


 


Temperature 36.8 C 36.3 C


 


Pulse, 77 81





Peripheral  


 


Respiratory 14 14





Rate  


 


Blood Pressure 137/70 141/79 H


 


O2 Sat by Pulse 98 98





Oximetry  














Physical Exam


General: Alert and oriented, no acute distress


Lungs: Clear to auscultation bilaterally


Heart: Regular rate and rhythm


Abdomen: Soft, minimal appropriate tenderness, non-distended, fundus midline, 

nontender, and at the umbilicus


Extremities: Trace edema in bilateral lower extremities to mid shins








ASSESSMENT: 


29-year-old female -0-0-1 s/p normal vaginal delivery PPD #1, complicated 

by preeclampsia with severe features and was kept on magnesium sulfate IV for 

24 hours after delivery and obesity





PLAN: 


Doing well


Magnesium sulfate IV was discontinued this morning.  


Patient has continued to have mild range blood pressures throughout the 

evening.  We will continue to monitor throughout the day today to ensure that 

she is not having any mild or severe range blood pressures prior to discharge


Breast-feeding with minimal difficulty. Assist as needed


Lochia minimal. Continue to monitor for appropriate lochia.


Continue routine postpartum care


Anticipate discharge home tomorrow








Adrián Levi MD


9:41 AM


2020

## 2020-06-08 RX ADMIN — FERROUS SULFATE TAB EC 324 MG (65 MG FE EQUIVALENT) SCH MG: 324 (65 FE) TABLET DELAYED RESPONSE at 07:26

## 2020-06-08 RX ADMIN — VITAMIN A, ASCORBIC ACID, CHOLECALCIFEROL, .ALPHA.-TOCOPHEROL ACETATE, DL-, THIAMINE MONONITRATE, RIBOFLAVIN, NIACINAMIDE, PYRIDOXINE HYDROCHLORIDE, FOLIC ACID, CYANOCOBALAMIN, CALCIUM CARBONATE, IRON, ZINC OXIDE, AND CUPRIC OXIDE SCH EACH: 4000; 120; 400; 22; 1.84; 3; 20; 10; 1; 12; 200; 29; 25; 2 TABLET ORAL at 07:27

## 2020-06-08 NOTE — PCM.DCSUM1
**Discharge Summary





- Hospital Course


Free Text/Narrative:: 





- General Info


Date of Service: 20


Mother's Due Date: 20





- Delivery Note


Labor: Augmented by Oxytocin


Cervical Ripening Method: Balloon Device (18 French transcervical Rae bulb 

filled with 60 mL of sterile saline), Misoprostil, Oxytocin


Delivery Outcome: Livebirth


Infant Delivery Method: Spontaneous Vaginal Delivery-Single


Birth Presentation: Left Occiput Anterior (MARIA LUISA)


Nuchal Cord: None


Prep: Povidone-Iodine (Betadine


Anesthesia Type: Epidural


Anesthetic: Lidocaine (Xylocaine) 1% Plain


Local Anesthetic Volume: Other (7 mL)


Amniotic Fluid Description: Clear


Episiotomy Type: None


Laceration: 2nd Degree (Midline perineal repaired with 3-0 Vicryl), Labial (

Right side, repaired with running suture of 4-0 Vicryl)


Suture type: Vicryl


Suture size: 3-0


Placenta: Intact, Spontaneous


Cord: 3 Vessels


Estimated Blood Loss: 400


Resuscitation Needed: Yes


[place]: Bulb Syringe, Cathether, Stimulated, Warmed, [place] Used, Warmer Used


 Provider: Adrián Levi


APGAR Score 1 min: 7


APGAR Score 5 min: 9


Second Stage Interventions: Reports: Pushing Effectively, Pushing, Stirrups/Leg 

Supports


Delivery Comments (Free Text/Narrative):: 





Stage I: Dipti Gutierrez was admitted for induction of labor in the setting of 

gestational hypertension with ongoing mild range blood pressures in the clinic.

  She had been seen in the clinic prior to being sent down to labor and 

delivery with a blood pressure of 144/94. On admission her cervix was dilated 

to 1.5 cm. She was GBS negative.  On admission her labs were all within normal 

limits and did not show any evidence of severe features of preeclampsia with 

platelets of 263, normal creatinine of 0.8, AST 20 and ALT 19.  Her urine 

protein/creatinine ratio was 0.10.  Based on these results she was diagnosed 

with gestational hypertension with continued monitoring during the induction.  

She had an 18 French Rae bulb placed transcervically with use of speculum and 

ring forceps and was filled with 60 mL of sterile saline.  She was given 

Cytotec 25 mcg vaginally for cervical ripening.  The Rae bulb came out 

spontaneously after approximately 4 hours of time.  She was transitioned to 

Pitocin for augmentation of labor.  She had spontaneous rupture of membranes 

with clear fluid.  In the early evening of hospital day 1 she began having 

severe range blood pressures into the 160s to 170s/90s to 100s.  She was 

treated with IV labetalol 20 mg and her blood pressure was rechecked and it 

continued to be in the 160s/100s and was given the next dose of labetalol 40 mg 

IV.  After 10 minutes her blood pressure was normalized.  She continued to have 

mild range blood pressures throughout the remainder of her induction.  She was 

given an epidural for anesthesia.  She progressed to complete and pushing.





Stage II: On 2020 she had a normal vaginal delivery of a live male infant 

at 06:45. Apgars of 7 & 9. Weight of 3580 g (7 lbs 14.3 oz). Length of 20 

inches. There was no nuchal cord. Infant was delivered in MARIA LUISA position. The 

cord was doubly clamped and cut by father the infant. Infant was placed on 

mother's abdomen initially and then taken to the warmer for further 

resuscitation.





Stage III: She had a spontaneous delivery of an intact placenta in Le[place] 

presentation.  Three vessel cord. She was given pitocin and fundal massage. She 

had a second-degree midline perineal laceration that was repaired with 3-0 

Vicryl.  She had a right labial laceration that was repaired with running 

suture of 4-0 Vicryl.  During repair of the right labial laceration she had 

injection of 1% lidocaine 7 mL.  Mom and baby were stable to recovery. EBL of 

400 mL.


HPI Initial Comments: 





- General Info


Date of Service: 20


Mother's Due Date: 20





- Delivery Note


Labor: Augmented by Oxytocin


Cervical Ripening Method: Balloon Device (18 French transcervical Rae bulb 

filled with 60 mL of sterile saline), Misoprostil, Oxytocin


Delivery Outcome: Livebirth


Infant Delivery Method: Spontaneous Vaginal Delivery-Single


Birth Presentation: Left Occiput Anterior (MARIA LUISA)


Nuchal Cord: None


Prep: Povidone-Iodine (Betadine


Anesthesia Type: Epidural


Anesthetic: Lidocaine (Xylocaine) 1% Plain


Local Anesthetic Volume: Other (7 mL)


Amniotic Fluid Description: Clear


Episiotomy Type: None


Laceration: 2nd Degree (Midline perineal repaired with 3-0 Vicryl), Labial (

Right side, repaired with running suture of 4-0 Vicryl)


Suture type: Vicryl


Suture size: 3-0


Placenta: Intact, Spontaneous


Cord: 3 Vessels


Estimated Blood Loss: 400


Resuscitation Needed: Yes


[place]: Bulb Syringe, Cathether, Stimulated, Warmed, [place] Used, Warmer Used


 Provider: Adrián Levi


APGAR Score 1 min: 7


APGAR Score 5 min: 9


Second Stage Interventions: Reports: Pushing Effectively, Pushing, Stirrups/Leg 

Supports


Delivery Comments (Free Text/Narrative):: 





Stage I: Dipti Gutierrez was admitted for induction of labor in the setting of 

gestational hypertension with ongoing mild range blood pressures in the clinic.

  She had been seen in the clinic prior to being sent down to labor and 

delivery with a blood pressure of 144/94. On admission her cervix was dilated 

to 1.5 cm. She was GBS negative.  On admission her labs were all within normal 

limits and did not show any evidence of severe features of preeclampsia with 

platelets of 263, normal creatinine of 0.8, AST 20 and ALT 19.  Her urine 

protein/creatinine ratio was 0.10.  Based on these results she was diagnosed 

with gestational hypertension with continued monitoring during the induction.  

She had an 18 French Rae bulb placed transcervically with use of speculum and 

ring forceps and was filled with 60 mL of sterile saline.  She was given 

Cytotec 25 mcg vaginally for cervical ripening.  The Rae bulb came out 

spontaneously after approximately 4 hours of time.  She was transitioned to 

Pitocin for augmentation of labor.  She had spontaneous rupture of membranes 

with clear fluid.  In the early evening of hospital day 1 she began having 

severe range blood pressures into the 160s to 170s/90s to 100s.  She was 

treated with IV labetalol 20 mg and her blood pressure was rechecked and it 

continued to be in the 160s/100s and was given the next dose of labetalol 40 mg 

IV.  After 10 minutes her blood pressure was normalized.  She continued to have 

mild range blood pressures throughout the remainder of her induction.  She was 

given an epidural for anesthesia.  She progressed to complete and pushing.





Stage II: On 2020 she had a normal vaginal delivery of a live male infant 

at 06:45. Apgars of 7 & 9. Weight of 3580 g (7 lbs 14.3 oz). Length of 20 

inches. There was no nuchal cord. Infant was delivered in MARIA LUISA position. The 

cord was doubly clamped and cut by father the infant. Infant was placed on 

mother's abdomen initially and then taken to the warmer for further 

resuscitation.





Stage III: She had a spontaneous delivery of an intact placenta in [place] 

presentation.  Three vessel cord. She was given pitocin and fundal massage. She 

had a second-degree midline perineal laceration that was repaired with 3-0 

Vicryl.  She had a right labial laceration that was repaired with running 

suture of 4-0 Vicryl.  During repair of the right labial laceration she had 

injection of 1% lidocaine 7 mL.  Mom and baby were stable to recovery. EBL of 

400 mL.


Brief History: - General Info.  Date of Service: 20.  Mother's Due Date: 

20.  - Delivery Note.  Labor: Augmented by Oxytocin.  Cervical Ripening 

Method: Balloon Device (18 French transcervical Rae bulb filled with 60 mL of 

sterile saline), Misoprostil, Oxytocin.  Delivery Outcome: Livebirth.  Infant 

Delivery Method: Spontaneous Vaginal Delivery-Single.  Birth Presentation: Left 

Occiput Anterior (MARIA LUISA).  Nuchal Cord: None.  Prep: Povidone-Iodine (Betadine.  

Anesthesia Type: Epidural.  Anesthetic: Lidocaine (Xylocaine) 1% Plain.  Local 

Anesthetic Volume: Other (7 mL).  Amniotic Fluid Description: Clear.  

Episiotomy Type: None.  Laceration: 2nd Degree (Midline perineal repaired with 3

-0 Vicryl), Labial (Right side, repaired with running suture of 4-0 Vicryl).  

Suture type: Vicryl.  Suture size: 3-0.  Placenta: Intact, Spontaneous.  Cord: 

3 Vessels.  Estimated Blood Loss: 400.  Resuscitation Needed: Yes.  [place]: 

Bulb Syringe, Cathether, Stimulated, Warmed, [place] Used, Warmer Used.  

 Provider: Adrián Levi.  APGAR Score 1 min: 7.  APGAR Score 5 min: 9.

  Second Stage Interventions: Reports: Pushing Effectively, Pushing, Stirrups/

Leg Supports.  Delivery Comments (Free Text/Narrative)::  Stage I: Dipti Gutierrez was admitted for induction of labor in the setting of gestational 

hypertension with ongoing mild range blood pressures in the clinic.  She had 

been seen in the clinic prior to being sent down to labor and delivery with a 

blood pressure of 144/94. On admission her cervix was dilated to 1.5 cm. She 

was GBS negative.  On admission her labs were all within normal limits and did 

not show any evidence of severe features of preeclampsia with platelets of 263, 

normal creatinine of 0.8, AST 20 and ALT 19.  Her urine protein/creatinine 

ratio was 0.10.  Based on these results she was diagnosed with gestational 

hypertension with continued monitoring during the induction.  She had an 18 

French Rae bulb placed transcervically with use of speculum and ring forceps 

and was filled with 60 mL of sterile saline.  She was given Cytotec 25 mcg 

vaginally for cervical ripening.  The Rae bulb came out spontaneously after 

approximately 4 hours of time.  She was transitioned to Pitocin for 

augmentation of labor.  She had spontaneous rupture of membranes with clear 

fluid.  In the early evening of hospital day 1 she began having severe range 

blood pressures into the 160s to 170s/90s to 100s.  She was treated with IV 

labetalol 20 mg and her blood pressure was rechecked and it continued to be in 

the 160s/100s and was given the next dose of labetalol 40 mg IV.  After 10 

minutes her blood pressure was normalized.  She continued to have mild range 

blood pressures throughout the remainder of her induction.  She was given an 

epidural for anesthesia.  She progressed to complete and pushing.  Stage II: On 

2020 she had a normal vaginal delivery of a live male infant at 06:45. 

Apgars of 7 & 9. Weight of 3580 g (7 lbs 14.3 oz). Length of 20 inches. There 

was no nuchal cord. Infant was delivered in MARIA LUISA position. The cord was doubly 

clamped and cut by father the infant. Infant was placed on mother's abdomen 

initially and then taken to the warmer for further resuscitation.  Stage III: 

She had a spontaneous delivery of an intact placenta in [place] presentation.  

Three vessel cord. She was given pitocin and fundal massage. She had a second-

degree midline perineal laceration that was repaired with 3-0 Vicryl.  She had 

a right labial laceration that was repaired with running suture of 4-0 Vicryl.  

During repair of the right labial laceration she had injection of 1% lidocaine 

7 mL.  Mom and baby were stable to recovery. EBL of 400 mL.


Diagnosis: Stroke: No





- Discharge Data


Discharge Date: 20


Discharge Disposition: Home, Self-Care 01


Condition: Good





- Referral to Home Health


Primary Care Physician: 


Adrián Levi MD








- Discharge Diagnosis/Problem(s)


(1) 37 weeks gestation of pregnancy


SNOMED Code(s): 92142294


   ICD Code: Z3A.37 - 37 WEEKS GESTATION OF PREGNANCY   Status: Acute   Current 

Visit: Yes   





(2) Obesity affecting pregnancy


SNOMED Code(s): 603513500681, 674009216101


   ICD Code: O99.210 - OBESITY COMPLICATING PREGNANCY, UNSPECIFIED TRIMESTER   

Status: Acute   Current Visit: Yes   





(3) Preeclampsia, severe


SNOMED Code(s): 72928662


   ICD Code: O14.10 - SEVERE PRE-ECLAMPSIA, UNSPECIFIED TRIMESTER   Status: 

Acute   Current Visit: Yes   





(4) Vaginal delivery


SNOMED Code(s): 553496508


   ICD Code: O80 - ENCOUNTER FOR FULL-TERM UNCOMPLICATED DELIVERY   Status: 

Acute   Current Visit: Yes   





(5) Second degree perineal laceration during delivery


SNOMED Code(s): 5217734


   ICD Code: O70.1 - SECOND DEGREE PERINEAL LACERATION DURING DELIVERY   Status

: Acute   Current Visit: Yes   





(6) Obstetric labial laceration, delivered, current hospitalization


SNOMED Code(s): 659801051, 398865856, 308635795


   ICD Code: O70.0 - FIRST DEGREE PERINEAL LACERATION DURING DELIVERY   Status: 

Acute   Current Visit: Yes   





- Patient Summary/Data


Complications: None


Consults: 





None


Hospital Course: 





Dipti Gutierrez was admitted for induction of labor in the setting of 

gestational hypertension with mild range blood pressures in the clinic.  On 

admission her cervix was dilated to 1.5 cm.  She was GBS negative.she had an 18 

French Rae bulb placed transcervically with use of speculum and ring forceps 

and was filled with 60 mL of sterile saline.  She was given Cytotec 25 mcg 

vaginally for cervical ripening.  She received 1 dose of Cytotec for induction.

  She was transitioned to Pitocin for augmentation of labor.  She had 

spontaneous rupture of membranes with clear fluid.  In the early evening of 

hospital day #1 she began having severe range blood pressures into the 160s to 

170s/90s to 100s.  She was treated with labetalol 20 mg IV and on recheck 

continued to be severe range blood pressure and was given labetalol 40 mg IV.  

Her blood pressures normalized afterwards and were in normal to mild range 

throughout the remainder of the induction.  She was started on magnesium for 

seizure prophylaxis after being diagnosed with preeclampsia with severe 

features.  She was given an epidural for anesthesia.  She progressed to 

complete and began pushing.  On 2020 she had a normal vaginal delivery of a 

live male infant at 06:45.  Apgars of 7 and 9.  Weight of 3580 g (7 pounds 14.3 

ounces).  Her postpartum course was uneventful.  She was continued on magnesium 

sulfate IV for 24 hours after delivery.  Her pain was well controlled and she 

had minimal lochia.  She was ambulating, tolerating a regular diet and voiding 

normally.  She was breast-feeding with small amounts of formula supplementation 

with minimal difficulty.  She was afebrile and her hematocrit was 37.4 on 

admission.she was continued to be monitored throughout postpartum day #1 due to 

ongoing mild range blood pressures.  She had normal range blood pressures by 

postpartum day #2.  She desired to be discharged home on the morning of PPD #2.

  Her blood type is A+.








- Patient Instructions


Diet: Regular Diet as Tolerated


Activity: Apply Ice, As Tolerated


Activity, Other: Nothing in the vagina for 6 weeks


Driving: May Drive Today


Showering/Bathing: May Shower


Notify Provider of: Fever, Increased Pain, Swelling and Redness, Drainage, 

Nausea and/or Vomiting


Other/Special Instructions: Please contact your physician's office if you have 

heavy vaginal bleeding enough to soak a pad in less than an hour for several 

hours.  Monitor for any signs of an infection in the breasts with severe pain 

or redness of the breast.  Monitor for any signs of elevated blood pressure 

such as severe headaches, flashes of light in her vision or pain in your upper 

stomach.





- Discharge Plan


*PRESCRIPTION DRUG MONITORING PROGRAM REVIEWED*: Not Applicable


*COPY OF PRESCRIPTION DRUG MONITORING REPORT IN PATIENT EUSEBIO: Not Applicable


Home Medications: 


 Home Meds





Iron 18 mg PO DAILY 20 [History]


Pnv,Calcium 72/Iron/Folic Acid [Pnv Prenatal Plus Multivit Tab] 1 each PO DAILY 

20 [History]


Acetaminophen [Tylenol] 650 mg PO Q6H PRN  tablet 20 [Rx]


Benzocaine/Menthol [Dermoplast Pain Relief Spray] 1 spray TOP ASDIRECTED PRN  

canister 20 [Rx]


Docusate Sodium [Colace] 100 mg PO BID PRN  cap 20 [Rx]


Hydrocortisone Acetate [Anucort-HC] 25 mg RECTAL BID PRN  supp 20 [Rx]


Ibuprofen [Motrin] 600 mg PO Q6H PRN  tablet 20 [Rx]


Magnesium Hydroxide [Milk of Magnesia] 30 ml PO BEDTIME PRN  cup 20 [Rx]


witch hazeL [Tucks] 1 pad TOP ASDIRECTED PRN  pad 20 [Rx]








Patient Handouts:  Preeclampsia and Eclampsia, Care of a Perineal Tear, 

Postpartum Care After Vaginal Delivery


Referrals: 


Adrián Levi MD [Primary Care Provider] -  (Follow-up in 1 week for routine 

postpartum visit or earlier as needed.)





- Discharge Summary/Plan Comment


DC Time >30 min.: No





- Patient Data


Vitals - Most Recent: 


 Last Vital Signs











Temp  37.0 C   20 04:11


 


Pulse  79   20 04:11


 


Resp  14   20 04:11


 


BP  135/71   20 04:11


 


Pulse Ox  97   20 04:11











Weight - Most Recent: 118.841 kg


Med Orders - Current: 


 Current Medications





Acetaminophen (Tylenol)  650 mg PO Q6H PRN


   PRN Reason: mild pain or fever


   Last Admin: 20 22:31 Dose:  650 mg


Benzocaine/Menthol (Dermoplast Pain Relief Spray)  0 gm TOP ASDIRECTED PRN


   PRN Reason: Perineal Comfort Measure


   Last Admin: 20 12:56 Dose:  1 spray


Calcium Gluconate (Calcium Gluconate)  1 gm IV ASDIRECTED PRN


   PRN Reason: respiratory distress


Docusate Sodium (Colace)  100 mg PO BID PRN


   PRN Reason: Constipation


   Last Admin: 20 09:06 Dose:  100 mg


Ferrous Sulfate (Ferrous Sulfate)  324 mg PO WITHBREAKFAST JANE


   Last Admin: 20 07:26 Dose:  324 mg


Hydrocortisone Acetate (Anucort-Hc)  25 mg RECTAL BID PRN


   PRN Reason: Hemorrhoid pain


Lactated Ringer's (Ringers, Lactated)  1,000 mls @ 75 mls/hr IV ASDIRECTED JANE


   Last Admin: 20 20:48 Dose:  75 mls/hr


Oxytocin/Lactated Ringer's (Pitocin In Lr 10 Units/1,000 Ml)  10 unit in 1,000 

mls @ 100 mls/hr IV TITRATE JANE; Protocol


Ibuprofen (Motrin)  600 mg PO Q6H PRN


   PRN Reason: Mild pain or fever


   Last Admin: 20 00:25 Dose:  600 mg


Magnesium Hydroxide (Milk Of Magnesia)  30 ml PO BEDTIME PRN


   PRN Reason: Constipation


Prenat Multivit/Time/Iron/Folic Ac (Prenatal Plus Iron)  1 each PO DAILY JANE


   Last Admin: 20 07:27 Dose:  1 each


Witch Hazel (Tucks)  1 pad TOP ASDIRECTED PRN


   PRN Reason: Perineal Comfort Measure


   Last Admin: 20 12:56 Dose:  1 pad





Discontinued Medications





Acetaminophen (Tylenol)  650 mg PO Q6H PRN


   PRN Reason: Pain (Mild 1-3) and fever


Acetaminophen (Tylenol)  650 mg PO Q6H PRN


   PRN Reason: Pain (Mild 1-3) and fever


Bupivacaine HCl (Sensorcaine-Mpf 0.25%)  10 ml .ROUTE .STK-MED ONE


   Stop: 20 06:01


Calcium Gluconate (Calcium Gluconate)  1 gm IVPUSH ONETIME PRN


   PRN Reason: Other


   Stop: 20 22:00


Diphenhydramine HCl (Benadryl)  25 mg IVPUSH Q6H PRN


   PRN Reason: pruritis


Ephedrine Sulfate (Ephedrine Sulfate)  5 mg IVPUSH ASDIRECTED PRN


   PRN Reason: Hypotension


Fentanyl (Sublimaze)  100 mcg EPIDUR Q3H PRN


   PRN Reason: Pain


   Last Admin: 20 22:05 Dose:  100 mcg


Fentanyl/Bupivacaine HCl (Fentanyl/Bupivacaine/Ns 2 Mcg-0.125% 100 Ml)  100 ml 

EPIDUR ASDIRECTED PRN


   PRN Reason: Pain


   Last Admin: 20 22:05 Dose:  100 ml


Lactated Ringer's (Ringers, Lactated)  1,000 mls @ 100 mls/hr IV ASDIRECTED JANE


   Last Infusion: 20 23:00 Dose:  35 mls/hr


Lactated Ringer's (Ringers, Lactated)  1,000 mls @ 40 mls/hr IV ASDIRECTED JANE


Oxytocin/Lactated Ringer's (Pitocin In Lr 10 Units/1,000 Ml)  10 unit in 1,000 

mls @ 100 mls/hr IV .CONTINUOUS JANE


   Last Admin: 20 07:07 Dose:  500 mls/hr


Oxytocin/Lactated Ringer's (Pitocin In Lr 10 Units/1,000 Ml)  10 unit in 1,000 

mls @ 12 mls/hr IV TITRATE JANE; Protocol


   Last Titration: 20 06:45 Dose:  999 munits/min, 5,994 mls/hr


Magnesium Sulfate (Magnesium Sulfate In Water Premix)  40 gm in 1,000 mls @ 50 

mls/hr IV ASDIRECTED JANE


   Stop: 20 06:45


   Last Admin: 20 17:21 Dose:  50 mls/hr


Magnesium Sulfate 4 gm/ Premix  50 mls @ 12.5 mls/hr IV ONETIME ONE


   Stop: 20 01:09


   Last Admin: 20 21:38 Dose:  100 mls/hr


Labetalol HCl (Normodyne)  20 mg IVPUSH ONETIME STA; Protocol


   Stop: 20 20:14


   Last Admin: 20 20:20 Dose:  20 mg


Labetalol HCl (Normodyne)  40 mg IVPUSH ONETIME STA; Protocol


   Stop: 20 20:45


   Last Admin: 20 20:49 Dose:  40 mg


Lidocaine HCl (Xylocaine 1%) Confirm Administered Dose 50 ml .ROUTE .STK-MED ONE


   Stop: 20 07:05


   Last Admin: 20 12:56 Dose:  50 ml


Lidocaine HCl (Xylocaine 1%)  50 ml INJECT ONETIME ONE


   Stop: 20 07:09


   Last Admin: 20 16:01 Dose:  Not Given


Misoprostol (Cytotec)  25 mcg VAG Q4H PRN


   PRN Reason: cervical ripening


Misoprostol (Cytotec) Confirm Administered Dose 25 mcg .ROUTE .STK-MED ONE


   Stop: 20 11:55


   Last Admin: 20 12:05 Dose:  25 mcg


Nalbuphine HCl (Nubain)  10 mg IVPUSH Q2H PRN


   PRN Reason: Pain


Sodium Chloride (Saline Flush)  10 ml FLUSH ASDIRECTED PRN


   PRN Reason: Keep Vein Open

## 2020-06-08 NOTE — PCM.SN.2
- Free Text/Narrative


Note: 





Post Partum Progress Note


PPD #2





Subjective:


Doing well overall.  Ambulating without difficulty and increasing activity.  

Lochia minimal.  Voiding without difficulty.  Tolerating regular diet without 

nausea or vomiting.  Pain controlled with oral medications and states that it 

is improving today.  Breast-feeding with small amounts of formula 

supplementation as needed to start feeding with minimal difficulty.  She denies 

any headaches, vision changes or epigastric pain.





Objective: 


Vitals:


 Vital Signs - 24 hr











  20





  12:27 16:10 20:30


 


Temperature  36.6 C 36.4 C


 


Pulse, 88 87 87





Peripheral   


 


Respiratory 14 14 14





Rate   


 


Blood Pressure 143/79 H 134/84 136/75


 


O2 Sat by Pulse 98 100 97





Oximetry   














  20





  04:11


 


Temperature 37.0 C


 


Pulse, 79





Peripheral 


 


Respiratory 14





Rate 


 


Blood Pressure 135/71


 


O2 Sat by Pulse 97





Oximetry 

















Physical Exam


General: Alert and oriented, no acute distress


Lungs: Clear to auscultation bilaterally


Heart: Regular rate and rhythm


Abdomen: Soft, minimal appropriate tenderness, non-distended, fundus midline, 

nontender, and at the umbilicus


Extremities: Trace edema in bilateral lower extremities to ankles








ASSESSMENT: 


29-year-old female -0-0-1 s/p normal vaginal delivery PPD #2, complicated 

by preeclampsia with severe features and was kept on magnesium sulfate IV for 

24 hours after delivery and obesity





PLAN: 


Doing well


Continued improvement with her blood pressures with occasional mild range blood 

pressures yesterday and none today.


Breast-feeding with small amounts of formula supplementation as needed with 

minimal difficulty. Assist as needed


Lochia minimal. Continue to monitor for appropriate lochia.


Continue routine postpartum care


Discharge home today








Adrián Levi MD


8:54 AM


2020

## 2021-06-10 NOTE — PCM.LDHP
L&D History of Present Illness





- General


Date of Service: 21


Admit Problem/Dx: 


                           Admission Diagnosis/Problem











Admission Diagnosis/Problem    














06/10/21 09:51


Dipti is a 30-year-old  2 para 1-0-0-1 female who was admitted on 

2021 at 37-0/7 weeks gestational age with an REINIER of 2021 for medical 

induction of labor for diagnosis of gestational hypertension.


Source of Information: Patient


History Limitations: Reports: No Limitations





- History of Present Illness


Introduction:: 





Dipti is a 30-year-old  2 para 1-0-0-1 female who was admitted on 

2021 at 37-0/7 weeks gestational age with an REINIER of 2021 for medical 

induction of labor for diagnosis of gestational hypertension.  She has been 

running borderline blood pressures throughout the last part of the pregnancy.  

Has a history of gestational hypertension progressing to preeclampsia with 

severe features with her last pregnancy.  She has been on a baby aspirin every 

day throughout the pregnancy since early second trimester.  The process of 

induction of labor, its risks, benefits, limitations, follow-up and alternatives

are discussed in detail with the patient.  She appears understand and wishes to 

proceed.





OB/GYN history: Patient had menarche at age 11.  Cycles q. 28 days.  Duration 5 

to 7 days.  Last certain menstrual period 2020.  Her REINIER of 2021 is 

determined by her LMP.  Patient denies any abnormal Pap smears.  She denies any 

STDs.  Her previous pregnancy ended as follows:





1.  Male infant born 2020 at 37-1/7 weeks gestational age8 pounds 14 

ouncesNSVDepidural usedsecond-degree midline perineal laceration and right 

labial laceration repaired.  Induction was for gestational hypertension with 

development of preeclampsia with severe features.





Prenatal course.  Patient was initially seen for this pregnancy on 2020.  

She is seen on a regular basis throughout the pregnancy.  Her weight increase 

was from 246 to 269 pounds for 23 pound increase.  Her blood pressures have been

borderline normal to borderline elevated during the last portion of the 

pregnancy.  Her fundal height growth has been appropriate.  Baby is in vertex 

presentation by Leopold maneuvers on last evaluation in clinic on 2021.  Her

group B strep screen is negative.  Hepatitis C antibody was negative.  She 

declined flu vaccination as she is allergic to eggs.  Her EPDS score on 2021

was 0/30.  Tdap given on 2021.  Patient is rubella immune.





Laboratory testing pregnancy shows first prenatal laboratory testing done 

recently in the clinic has returned within normal limits. Please call the 

patient with results and place them in the laboratory portion of her prenatal 

chart. Thank you.  To include a positive blood type negative antibody screen.  

Hemoglobin is 11.7 g/dL.  Platelets were 303,000.  She is rubella immune.  RPR 

is nonreactive.  Patient had Gardnerella vaginalis and Flagyl was recommended 

for bacterial vaginosis.  Hepatitis B surface antigen and HIV assays were both 

negative as were the chlamydia and gonorrhea test.  Hepatitis C antibody was 

negative on 2021.  Second trimester labs showed a hemoglobin of 11.2 g/dL 

and platelets at 300,000.  Her 1 hour GTT was normal at 118.  Recheck of her 

hemoglobin on 5/10/2020 and was 10.5 g/dL and platelets are 272,000.  RPR on 

2021 was nonreactive and group B strep screen was negative.





Allergies:





1.  Penicillins which cause a rash





2.  Eggs which cause diarrhea





3.  Latex gloves which cause a rash.





Medications:





1.  Prenatal vitamins 1 p.o. daily





2.  Baby aspirin 81 mg p.o. daily





Family history: Maternal grandmother, maternal aunt, maternal great aunt with 

estrogen induced cervical cancer by patient's history.  Sister and mother with 

history of asthma.  No anesthesia, bleeding, blood clotting problems noted in 

the palate.





Social history: Patient is .   is Vinicio Sexton.  She works as a 

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  She is a college graduate.  She does not use any significance 

alcohol, drugs or tobacco.  She lives in [place], [place] with her 

family.





Review of systems:





In general patient has no complaints.  Baby has been active.





Skin: Negative





Lungs: No infectious symptoms or shortness of breath





Cardiovascular: No chest pain or exercise intolerance





Breasts: No lumps, changes in size, pain, dimpling, discharge or axillary or 

supraclavicular concerns.





GI: Negative





: Pregnancy changes noted.





Musculoskeletal: Negative





Neurological: Negative





Physical exam:





In general the patient is well-developed, well-nourished, pleasant female in no 

acute distress.  Her weight at first  visit at 246 pounds.  Height is 5 

feet 9 inches.  Prepregnancy body mass index is 34.7.  On last evaluation clinic

patient's blood pressure is 138/80.  Weight was 269 pounds





Skin is warm dry without lesions.





HEENT, neck and back within normal limits.





Lungs are clear with good breath sounds in all lung fields.





Cardiovascular exam shows regular and rhythm without murmurs.





Breast exam not done at this time having been done previously and found to be 

normal.





Abdomen is gravid with fundal height at 36 cm on last evaluation clinic on 

2021.





Genital last cervical evaluation in clinic on 2021 shows cervix to be 1.5 

cm, 50% effaced, -4 station, soft, mid position.





Extremities and neurological exam are grossly within normal limits.








- Related Data


Allergies/Adverse Reactions: 


                                    Allergies











Allergy/AdvReac Type Severity Reaction Status Date / Time


 


latex Allergy  Rash Verified 20 11:46


 


Penicillins Allergy  Rash Verified 20 11:46


 


pumpkin Allergy  Rash Verified 20 11:46


 


eggs Allergy  Nausea and Uncoded 20 11:46





   Vomiting  











Home Medications: 


                                    Home Meds





Iron 18 mg PO DAILY 20 [History]


Pnv,Calcium 72/Iron/Folic Acid [Pnv Prenatal Plus Multivit Tab] 1 each PO DAILY 

20 [History]


Acetaminophen [Tylenol] 650 mg PO Q6H PRN  tablet 20 [Rx]


Benzocaine/Menthol [Dermoplast Pain Relief Spray] 1 spray TOP ASDIRECTED PRN  

canister 20 [Rx]


Docusate Sodium [Colace] 100 mg PO BID PRN  cap 20 [Rx]


Hydrocortisone Acetate [Anucort-HC] 25 mg RECTAL BID PRN  supp 20 [Rx]


Ibuprofen [Motrin] 600 mg PO Q6H PRN  tablet 20 [Rx]


Magnesium Hydroxide [Milk of Magnesia] 30 ml PO BEDTIME PRN  cup 20 [Rx]


witch hazeL [Tucks] 1 pad TOP ASDIRECTED PRN  pad 20 [Rx]











Past Medical History


Cardiovascular History: Reports: Hypertension (gestational)


Respiratory History: Reports: None


Gastrointestinal History: Reports: GERD (with preg)


OB/GYN History: Reports: Pregnancy


Endocrine/Metabolic History: Reports: Obesity/BMI 30+





Social & Family History





- Family History


Family Medical History: No Pertinent Family History





- Caffeine Use


Caffeine Use: Reports: None





- Living Situation & Occupation


Living situation: Reports: , with Spouse


Occupation: Employed





H&P Review of Systems





- Review of Systems:


Review Of Systems: See Below





L&D Exam





- Exam


Exam: See Below





- Problem List


(1) Gestational hypertension


SNOMED Code(s): 450658566


   ICD Code: O13.9 - GESTATIONAL HTN W/O SIGNIFICANT PROTEINURIA, UNSP TRIMESTER

   Status: Acute   





(2) 37 weeks gestation of pregnancy


SNOMED Code(s): 63528196


   ICD Code: Z3A.37 - 37 WEEKS GESTATION OF PREGNANCY   Status: Acute   


Problem List Initiated/Reviewed/Updated: Yes


Assessment/Plan Comment:: 





1. Dipti is a 30-year-old  2 para 1-0-0-1 female who was admitted on 

2021 at 37-0/7 weeks gestational age with an REINIER of 2021 for medical 

induction of labor for diagnosis of gestational hypertension.





2.  Risk factors include obesity, gestational hypertension, history of 

preeclampsia severe features with last pregnancy





3.  Patient plans to breast-feed:





4.  Patient considering epidural in labor for analgesia





5.  Patient has received her Tdap.  She is rubella immune.





Plan:





1.  Induction of labor.  Decision about type of induction will wait until cervix

 is evaluated on the a.m. of induction.





2.  Monitor blood pressure closely





3.  Epidural as needed for patient if desired





4.  Admission labs to consist of CBC, COVID-19 testing, RPR per protocol.





5.  Anticipate .

## 2021-06-11 ENCOUNTER — HOSPITAL ENCOUNTER (INPATIENT)
Dept: HOSPITAL 41 - JD.OB | Age: 31
LOS: 1 days | Discharge: HOME | End: 2021-06-12
Attending: OBSTETRICS & GYNECOLOGY | Admitting: OBSTETRICS & GYNECOLOGY
Payer: COMMERCIAL

## 2021-06-11 DIAGNOSIS — Z88.0: ICD-10-CM

## 2021-06-11 DIAGNOSIS — E66.9: ICD-10-CM

## 2021-06-11 DIAGNOSIS — Z91.040: ICD-10-CM

## 2021-06-11 DIAGNOSIS — Z20.822: ICD-10-CM

## 2021-06-11 DIAGNOSIS — K21.9: ICD-10-CM

## 2021-06-11 DIAGNOSIS — Z3A.37: ICD-10-CM

## 2021-06-11 DIAGNOSIS — Z91.012: ICD-10-CM

## 2021-06-11 DIAGNOSIS — Z79.899: ICD-10-CM

## 2021-06-11 PROCEDURE — 3E033VJ INTRODUCTION OF OTHER HORMONE INTO PERIPHERAL VEIN, PERCUTANEOUS APPROACH: ICD-10-PCS | Performed by: OBSTETRICS & GYNECOLOGY

## 2021-06-11 PROCEDURE — 3E0R3BZ INTRODUCTION OF ANESTHETIC AGENT INTO SPINAL CANAL, PERCUTANEOUS APPROACH: ICD-10-PCS | Performed by: OBSTETRICS & GYNECOLOGY

## 2021-06-11 PROCEDURE — 10907ZC DRAINAGE OF AMNIOTIC FLUID, THERAPEUTIC FROM PRODUCTS OF CONCEPTION, VIA NATURAL OR ARTIFICIAL OPENING: ICD-10-PCS | Performed by: OBSTETRICS & GYNECOLOGY

## 2021-06-11 PROCEDURE — U0002 COVID-19 LAB TEST NON-CDC: HCPCS

## 2021-06-11 NOTE — PCM.PREANE
Preanesthetic Assessment





- Procedure


Proposed Procedure: 





Labor Epidural





- Anesthesia/Transfusion/Family Hx


Anesthesia History: Prior Anesthesia Without Reaction


Family History of Anesthesia Reaction: No


Transfusion History: No Prior Transfusion(s)





- Review of Systems


General: No Symptoms


Pulmonary: No Symptoms


Cardiovascular: Other (Gestational Hypertension)


Gastrointestinal: Abdominal Pain (Uterine Contractions), Other (GERD with 

pregnancy)


Neurological: No Symptoms


Other: Reports: None





- Physical Assessment


Vital Signs: 





                                Last Vital Signs











Temp  36.7 C   06/11/21 07:45


 


Pulse  99   06/11/21 07:45


 


Resp  14   06/11/21 07:45


 


BP  148/90 H  06/11/21 07:45


 


Pulse Ox  99   06/11/21 07:45











Height: 1.75 m


Weight: 120.746 kg


ASA Class: 2


Mental Status: Alert & Oriented x3


Airway Class: Mallampati = 1


Dentition: Reports: Normal Dentition


Thyro-Mental Finger Breadths: 3


Mouth Opening Finger Breadths: 3


ROM/Head Extension: Full


Lungs: Clear to Auscultation, Normal Respiratory Effort


Cardiovascular: Regular Rate, Regular Rhythm





- Lab


Values: 





                             Laboratory Last Values











WBC  8.20 K/mm3 (3.98-10.04)   06/11/21  08:18    


 


RBC  4.28 M/mm3 (3.98-5.22)   06/11/21  08:18    


 


Hgb  10.8 gm/dl (11.2-15.7)  L  06/11/21  08:18    


 


Hct  33.8 % (34.1-44.9)  L  06/11/21  08:18    


 


MCV  79.0 fl (79.4-94.8)  L  06/11/21  08:18    


 


MCH  25.2 pg (25.6-32.2)  L  06/11/21  08:18    


 


MCHC  32.0 g/dl (32.2-35.5)  L  06/11/21  08:18    


 


RDW Std Deviation  47.6 fL (36.4-46.3)  H  06/11/21  08:18    


 


Plt Count  233 K/mm3 (182-369)   06/11/21  08:18    


 


MPV  11.1 fl (9.4-12.3)   06/11/21  08:18    


 


Neut % (Auto)  80.1 % (34.0-71.1)  H  06/11/21  08:18    


 


Lymph % (Auto)  10.1 % (19.3-51.7)  L  06/11/21  08:18    


 


Mono % (Auto)  8.3 % (4.7-12.5)   06/11/21  08:18    


 


Eos % (Auto)  0.9  (0.7-5.8)   06/11/21  08:18    


 


Baso % (Auto)  0.2 % (0.1-1.2)   06/11/21  08:18    


 


Neut # (Auto)  6.57 K/mm3 (1.56-6.13)  H  06/11/21  08:18    


 


Lymph # (Auto)  0.83 K/mm3 (1.18-3.74)  L  06/11/21  08:18    


 


Mono # (Auto)  0.68 K/mm3 (0.24-0.36)  H  06/11/21  08:18    


 


Eos # (Auto)  0.07 K/mm3 (0.04-0.36)   06/11/21  08:18    


 


Baso # (Auto)  0.02 K/mm3 (0.01-0.08)   06/11/21  08:18    


 


SARS-CoV-2 RNA (SAMMI)  Negative  (NEGATIVE)   06/11/21  07:30    


 


Blood Type  A POSITIVE   06/11/21  08:18    


 


Gel Antibody Screen  Negative   06/11/21  08:18    














- Allergies


Allergies/Adverse Reactions: 


                                    Allergies











Allergy/AdvReac Type Severity Reaction Status Date / Time


 


latex Allergy  Rash Verified 06/11/21 07:56


 


Penicillins Allergy  Rash Verified 06/11/21 07:56


 


pumpkin Allergy  Rash Verified 06/11/21 07:56


 


eggs Allergy  Nausea and Uncoded 06/05/20 11:46





   Vomiting  














- Acknowledgements


Anesthesia Type Planned: Epidural


Pt an Appropriate Candidate for the Planned Anesthesia: Yes


Alternatives and Risks of Anesthesia Discussed w Pt/Guardian: Yes


Pt/Guardian Understands and Agrees with Anesthesia Plan: Yes





PreAnesthesia Questionnaire


Cardiovascular History: Reports: Hypertension


Respiratory History: Reports: None


Gastrointestinal History: Reports: GERD


OB/GYN History: Reports: Pregnancy


Endocrine/Metabolic History: Reports: Obesity/BMI 30+


Hematologic History: Reports: Anemia





- SUBSTANCE USE


Tobacco Use Status *Q: Never Tobacco User


Tobacco Use Within Last Twelve Months: No


Second Hand Smoke Exposure: No


Recreational Drug Use History: No





- HOME MEDS


Home Medications: 


                                    Home Meds





Iron 18 mg PO DAILY 06/05/20 [History]


Pnv,Calcium 72/Iron/Folic Acid [Pnv Prenatal Plus Multivit Tab] 1 each PO DAILY 

06/05/20 [History]


Aspirin 81 mg PO DAILY 06/11/21 [History]











- CURRENT (IN HOUSE) MEDS


Current Meds: 





                               Current Medications





Acetaminophen (Acetaminophen 325 Mg Tab)  650 mg PO Q4H PRN


   PRN Reason: Pain (Mild 1-3) and fever


Calcium Carbonate/Glycine (Calcium Carbonate 500 Mg Tab.Chew)  1,000 mg PO Q2H 

PRN


   PRN Reason: Indigestion


Diphenhydramine HCl (Diphenhydramine 50 Mg/Ml Sdv)  25 mg IVPUSH Q6H PRN


   PRN Reason: pruritis


Ephedrine Sulfate (Ephedrine 50 Mg/Ml Sdv)  5 mg IVPUSH ASDIRECTED PRN


   PRN Reason: Hypotension


Fentanyl (Fentanyl 100 Mcg/2 Ml Sdv)  100 mcg EPIDUR Q3H PRN


   PRN Reason: Pain


   Last Admin: 06/11/21 20:05 Dose:  100 mcg


   Documented by: 


Fentanyl/Bupivacaine HCl (Bupivacaine/Fentanyl/Ns 100 Ml Bag)  100 ml EPIDUR 

ASDIRECTED PRN


   PRN Reason: Pain


   Last Admin: 06/11/21 20:04 Dose:  100 ml


   Documented by: 


Oxytocin/Lactated Ringer's (Pitocin In Lr 10 Units/1,000 Ml)  10 unit in 1,000 

mls @ 12 mls/hr IV TITRATE JANE; Protocol


   Last Titration: 06/11/21 13:45 Dose:  16 munits/min, 96 mls/hr


   Documented by: 


Oxytocin/Lactated Ringer's (Pitocin In Lr 10 Units/1,000 Ml)  10 unit in 1,000 

mls @ 100 mls/hr IV .CONTINUOUS JANE; Protocol


Lactated Ringer's (Ringers, Lactated)  1,000 mls @ 100 mls/hr IV ASDIRECTED JANE


   Last Admin: 06/11/21 20:13 Dose:  999 mls/hr


   Documented by: 


Nalbuphine HCl (Nalbuphine 10 Mg/1 Ml Vial)  10 mg IVPUSH Q2H PRN


   PRN Reason: Pain


Ondansetron HCl (Ondansetron 4 Mg/2 Ml Sdv)  4 mg IVPUSH Q4H PRN


   PRN Reason: Nausea/Vomiting


Sodium Chloride (Sodium Chloride 0.9% 10 Ml Syringe)  10 ml FLUSH ASDIRECTED PRN


   PRN Reason: Keep Vein Open





Discontinued Medications





Lidocaine HCl (Lidocaine 1% 50 Ml Mdv)  50 ml INJECT ONETIME ONE


   Stop: 06/11/21 07:45

## 2021-06-12 NOTE — PCM.SN.2
- Free Text/Narrative


Note: 








Postpartum note: Postpartum day #1





Patient is doing well in the postpartum period. Minimal lochia, voiding well, 

ambulated without problems. Nursing without concerns.





Patient is afebrile, vital signs are stable





Abdomen is flat, soft, uterus is below the umbilicus and is firm and nontender 

per nursing evaluation.  Patient is up in chair at the time I evaluated her.  I 

have not repeated her abdominal exam..





Legs are nontender.  Trace edema noted.





Assessment: Postpartum recovery going well.





Plan: Routine postpartum care. Patient be discharged home within the next 24-48 

hours.

## 2021-06-12 NOTE — PCM.DCSUM1
**Discharge Summary





- Hospital Course


Free Text/Narrative:: 








Stage I:  Dipti is a 30-year-old  2 now para 2-0-0-2 female who was 

admitted on 2021 at 37-0/7 weeks gestational age with an REINIER of 2021 

for medical induction of labor for diagnosis of gestational hypertension.  

Patient had been having borderline elevated blood pressures during the latter 

part of the pregnancy.  Decision was made to proceed with induction of labor at 

37-0/7 weeks gestational age.  The procedure, process, expectations, success, 

failure rate, risk, benefits and alternatives are discussed in detail with 

patient and her .  They appear to understand and wished to proceed.





On the morning of induction patient was started on Pitocin as cervix at that 

time was only 1 to 2 cm dilated with -3 station.  She was slowly advanced and 

Pitocin to a regular contraction pattern of every 3-minute contractions/moderate

intensity/lasted for 45 to 60 seconds.  Cervix made adequate change and at 

approximately mid afternoon patient underwent artificial rupture membranes with 

resultant clear amniotic fluid.  She progressed steadily thereafter.  Fetal 

heart tones are reassuring.  Approximate centimeters she received epidural for 

labor analgesia with good results.





Stage II: The patient delivered a viable, mccall, male infant named Ryan Wade at 6 hrs. on 2021.  The baby delivered in a left occiput anterior

position.  There was normal external restitution of the head, anterior shoulders

was delivered with mild downward traction and posterior shoulder delivered with 

mild upward traction on the baby.  Patient delivered with 2 pushes.  The baby 

was placed on mom's abdomen.  He was dried with a warm blanket.  Nose and mouth 

were bulb suction.  Pitocin was increased to 500 cc an hour to facilitate 

increase in uterine tone and decrease likelihood of bleeding.  The patient 

incurred no perineal lacerations therefore no suturing was required.  The baby 

had Apgars of 8 and 9.  He had a weight of 3130 g (6 pounds 14.4 ounces) and a 

length of 19.5 inches.  The umbilical cord is allowed to pulsate for approxi

mately 3 minutes and then was clamped x2 and cut by the baby's father Vinicio.  

There were 3 vessels in the umbilical cord.  Cord blood was obtained.





Stage III: The placenta delivered in a Hubbard presentation, appeared intact and

complete and was discarded per patient desire.  The estimated blood loss was 100

cc.  Patient plans to breast-feed and bottlefeed.  





Postpartum the patient has done well.  She is ambulating well, is voiding 

without concerns, has minimal lochia and is breast-feeding without problems.  

Her blood pressures have been in the 140s over 80s to 90 range.  She is feeling 

well.  She is desiring discharge home.  Condition: Good





Diagnosis: Stroke: No





- Discharge Data


Discharge Date: 21


Discharge Disposition: Home, Self-Care 01


Condition: Good





- Referral to Home Health


Primary Care Physician: 


Lester Estevez MD








- Discharge Diagnosis/Problem(s)


(1) Gestational hypertension


SNOMED Code(s): 522393673


   ICD Code: O13.9 - GESTATIONAL HTN W/O SIGNIFICANT PROTEINURIA, UNSP TRIMESTER

  Status: Acute   Current Visit: No   





(2) 37 weeks gestation of pregnancy


SNOMED Code(s): 34655047


   ICD Code: Z3A.37 - 37 WEEKS GESTATION OF PREGNANCY   Status: Acute   Current 

Visit: No   





- Patient Instructions


Diet: Regular Diet as Tolerated (Nursing diet with increased calories and 

calcium as recommended)


Activity: As Tolerated (No intercourse or tampons until bleeding resolves)


Driving: May Drive Today


Showering/Bathing: May Shower (May take a bath)


Notify Provider of: Fever, Increased Pain, Swelling and Redness, Nausea and/or 

Vomiting





- Discharge Plan


Home Medications: 


                                    Home Meds





Iron 18 mg PO DAILY 20 [History]


Pnv,Calcium 72/Iron/Folic Acid [Pnv Prenatal Plus Multivit Tab] 1 each PO DAILY 

20 [History]


Acetaminophen [Tylenol] 650 mg PO Q4H PRN  tablet 21 [Rx]


Ibuprofen [Motrin] 600 mg PO Q4H PRN  tablet 21 [Rx]








Patient Handouts:  Mastitis, Easy-to-Read, Breast Engorgement, Postpartum Care 

After Vaginal Delivery


Referrals: 


Adrián Levi MD [Physician] -  (Return to clinicDr. Georges the next 3 to 

5 days.)





- Discharge Summary/Plan Comment


DC Time >30 min.: No


Discharge Summary/Plan Comment: 








Discharge instructions:





1. Discharge home





2. Diet, activity and follow-up discussed with patient. Recommend nursing diet 

with increased calories and calcium.





3. Precautions given concern increased pain, bleeding, temperature, 

signs/symptoms of DVT/PE.





4. Medications per home medication was printed, discussed with and given to the 

patient.





5. Return to clinic-Dr. Levi[facility] the 

next 3 to 5 days..





Diagnosis:





1.  Gestational hypertension





2.  37-0/7-week intrauterine pregnancydelivered 





Condition: Good





- Patient Data


Vitals - Most Recent: 


                                Last Vital Signs











Temp  36.8 C   21 03:06


 


Pulse  86   21 03:06


 


Resp  14   21 03:06


 


BP  146/82 H  21 03:06


 


Pulse Ox  97   21 03:06











Weight - Most Recent: 120.746 kg


I&O - Last 24 hours: 


                                 Intake & Output











 21





 06:59 14:59 22:59


 


Intake Total 3500 0 


 


Balance 3500 0 











Med Orders - Current: 


                               Current Medications





Acetaminophen (Acetaminophen 325 Mg Tab)  650 mg PO Q4H PRN


   PRN Reason: mild pain or fever


Benzocaine/Menthol (Benzocaine/Menthol 20%-0.5% Spray 56 Gm Canister)  0 gm TOP 

ASDIRECTED PRN


   PRN Reason: Perineal Comfort Measure


Docusate Sodium (Docusate Sodium 100 Mg Cap)  100 mg PO BID PRN


   PRN Reason: Constipation


Ferrous Sulfate (Ferrous Sulfate 324 Mg Tab.Ec)  324 mg PO WITHBREAKFAST [facility]


   Last Admin: 21 08:19 Dose:  324 mg


   Documented by: 


Ibuprofen (Ibuprofen 600 Mg Tab)  600 mg PO Q4H PRN


   PRN Reason: Mild pain or fever


   Last Admin: 21 08:19 Dose:  600 mg


   Documented by: 


Prenat Multivit/Anderson/Iron/Folic Ac (Prenatal Multivitamin With Calcium/Folic 

Acid/Iron Tab)  1 each PO DAILY [facility]


   Last Admin: 21 08:19 Dose:  1 each


   Documented by: 


Witch Hazel (Witch Hazel Medicated Pads 40/Jar)  1 pad TOP ASDIRECTED PRN


   PRN Reason: Perineal Comfort Measure





Discontinued Medications





Acetaminophen (Acetaminophen 325 Mg Tab)  650 mg PO Q4H PRN


   PRN Reason: Pain (Mild 1-3) and fever


Bupivacaine HCl (Bupivacaine 0.25% 10 Ml Sdv)  10 ml .ROUTE .STK-MED ONE


   Stop: 21 21:01


Calcium Carbonate/Glycine (Calcium Carbonate 500 Mg Tab.Chew)  1,000 mg PO Q2H 

PRN


   PRN Reason: Indigestion


Diphenhydramine HCl (Diphenhydramine 50 Mg/Ml Sdv)  25 mg IVPUSH Q6H PRN


   PRN Reason: pruritis


Ephedrine Sulfate (Ephedrine 50 Mg/Ml Sdv)  5 mg IVPUSH ASDIRECTED PRN


   PRN Reason: Hypotension


Fentanyl (Fentanyl 100 Mcg/2 Ml Sdv)  100 mcg EPIDUR Q3H PRN


   PRN Reason: Pain


   Last Admin: 21 20:05 Dose:  100 mcg


   Documented by: 


Fentanyl/Bupivacaine HCl (Bupivacaine/Fentanyl/Ns 100 Ml Bag)  100 ml EPIDUR 

ASDIRECTED PRN


   PRN Reason: Pain


   Last Admin: 21 20:04 Dose:  100 ml


   Documented by: 


Oxytocin/Lactated Ringer's (Pitocin In Lr 10 Units/1,000 Ml)  10 unit in 1,000 

mls @ 12 mls/hr IV TITRATE JANE; Protocol


   Last Titration: 21 20:30 Dose:  0 munits/min, 0 mls/hr


   Documented by: 


Oxytocin/Lactated Ringer's (Pitocin In Lr 10 Units/1,000 Ml)  10 unit in 1,000 

mls @ 100 mls/hr IV .CONTINUOUS JANE; Protocol


Lactated Ringer's (Ringers, Lactated)  1,000 mls @ 100 mls/hr IV ASDIRECTED JANE


   Last Admin: 21 20:13 Dose:  999 mls/hr


   Documented by: 


Lidocaine HCl (Lidocaine 1% 50 Ml Mdv)  50 ml INJECT ONETIME ONE


   Stop: 21 07:45


Nalbuphine HCl (Nalbuphine 10 Mg/1 Ml Vial)  10 mg IVPUSH Q2H PRN


   PRN Reason: Pain


Ondansetron HCl (Ondansetron 4 Mg/2 Ml Sdv)  4 mg IVPUSH Q4H PRN


   PRN Reason: Nausea/Vomiting


Sodium Chloride (Sodium Chloride 0.9% 10 Ml Syringe)  10 ml FLUSH ASDIRECTED PRN


   PRN Reason: Keep Vein Open

## 2021-06-12 NOTE — [id number]
Post Anesthesia Note





- EVALUATION WITHIN 48HRS OF ANESTHETIC


Vital Signs in Normal Range: Yes


Patient Participated in Evaluation: Yes


Respiratory Function Stable: Yes


Airway Patent: Yes


Cardiovascular Function Stable: Yes


Hydration Status Stable: Yes


Pain Control Satisfactory: Yes


Nausea and Vomiting Control Satisfactory: Yes


Mental Status Recovered: Yes


Vital Signs: 


                                Last Vital Signs











Temp  36.8 C   06/12/21 03:06


 


Pulse  86   06/12/21 03:06


 


Resp  14   06/12/21 03:06


 


BP  146/82 H  06/12/21 03:06


 


Pulse Ox  97   06/12/21 03:06